# Patient Record
Sex: FEMALE | Race: WHITE | NOT HISPANIC OR LATINO | Employment: OTHER | URBAN - NONMETROPOLITAN AREA
[De-identification: names, ages, dates, MRNs, and addresses within clinical notes are randomized per-mention and may not be internally consistent; named-entity substitution may affect disease eponyms.]

---

## 2024-08-29 ENCOUNTER — PATIENT ROUNDING (BHMG ONLY) (OUTPATIENT)
Dept: FAMILY MEDICINE CLINIC | Facility: CLINIC | Age: 74
End: 2024-08-29
Payer: MEDICARE

## 2024-08-29 ENCOUNTER — OFFICE VISIT (OUTPATIENT)
Dept: FAMILY MEDICINE CLINIC | Facility: CLINIC | Age: 74
End: 2024-08-29
Payer: MEDICARE

## 2024-08-29 VITALS
HEART RATE: 53 BPM | DIASTOLIC BLOOD PRESSURE: 85 MMHG | SYSTOLIC BLOOD PRESSURE: 140 MMHG | OXYGEN SATURATION: 96 % | TEMPERATURE: 98 F | RESPIRATION RATE: 14 BRPM | BODY MASS INDEX: 42.61 KG/M2 | HEIGHT: 64 IN | WEIGHT: 249.6 LBS

## 2024-08-29 DIAGNOSIS — F32.A DEPRESSIVE DISORDER: ICD-10-CM

## 2024-08-29 DIAGNOSIS — I71.9 AORTIC ANEURYSM WITHOUT RUPTURE, UNSPECIFIED PORTION OF AORTA: Primary | ICD-10-CM

## 2024-08-29 DIAGNOSIS — F17.200 TOBACCO USE DISORDER: ICD-10-CM

## 2024-08-29 DIAGNOSIS — Z78.0 ASYMPTOMATIC MENOPAUSAL STATE: ICD-10-CM

## 2024-08-29 DIAGNOSIS — Z13.820 OSTEOPOROSIS SCREENING: ICD-10-CM

## 2024-08-29 DIAGNOSIS — I71.9 AORTIC ANEURYSM WITHOUT RUPTURE, UNSPECIFIED PORTION OF AORTA: ICD-10-CM

## 2024-08-29 DIAGNOSIS — K21.9 GASTROESOPHAGEAL REFLUX DISEASE WITHOUT ESOPHAGITIS: ICD-10-CM

## 2024-08-29 DIAGNOSIS — I10 PRIMARY HYPERTENSION: ICD-10-CM

## 2024-08-29 DIAGNOSIS — E11.9 TYPE 2 DIABETES MELLITUS WITHOUT COMPLICATION, WITHOUT LONG-TERM CURRENT USE OF INSULIN: ICD-10-CM

## 2024-08-29 DIAGNOSIS — Z00.00 ENCOUNTER FOR MEDICAL EXAMINATION TO ESTABLISH CARE: Primary | ICD-10-CM

## 2024-08-29 DIAGNOSIS — E78.5 DYSLIPIDEMIA: ICD-10-CM

## 2024-08-29 PROCEDURE — 83036 HEMOGLOBIN GLYCOSYLATED A1C: CPT

## 2024-08-29 PROCEDURE — 82043 UR ALBUMIN QUANTITATIVE: CPT

## 2024-08-29 PROCEDURE — 86803 HEPATITIS C AB TEST: CPT

## 2024-08-29 PROCEDURE — 85025 COMPLETE CBC W/AUTO DIFF WBC: CPT

## 2024-08-29 PROCEDURE — 80061 LIPID PANEL: CPT

## 2024-08-29 PROCEDURE — 80053 COMPREHEN METABOLIC PANEL: CPT

## 2024-08-29 PROCEDURE — 84443 ASSAY THYROID STIM HORMONE: CPT

## 2024-08-29 RX ORDER — FAMOTIDINE 40 MG/1
40 TABLET, FILM COATED ORAL DAILY
COMMUNITY
Start: 2024-06-19

## 2024-08-29 RX ORDER — LISINOPRIL 40 MG/1
40 TABLET ORAL DAILY
COMMUNITY
Start: 2024-06-19

## 2024-08-29 RX ORDER — VARENICLINE TARTRATE 0.5 (11)-1
KIT ORAL
Qty: 1 EACH | Refills: 0 | Status: SHIPPED | OUTPATIENT
Start: 2024-08-29 | End: 2024-09-26

## 2024-08-29 RX ORDER — LORATADINE 10 MG/1
10 TABLET ORAL DAILY
COMMUNITY

## 2024-08-29 RX ORDER — BUPROPION HYDROCHLORIDE 150 MG/1
150 TABLET ORAL DAILY
COMMUNITY
Start: 2024-06-19

## 2024-08-29 RX ORDER — VARENICLINE TARTRATE 1 MG/1
1 TABLET, FILM COATED ORAL 2 TIMES DAILY
Qty: 56 TABLET | Refills: 1 | Status: SHIPPED | OUTPATIENT
Start: 2024-09-26 | End: 2024-11-21

## 2024-08-29 RX ORDER — ALBUTEROL SULFATE 90 UG/1
2 AEROSOL, METERED RESPIRATORY (INHALATION) EVERY 4 HOURS PRN
COMMUNITY
Start: 2024-05-14

## 2024-08-29 RX ORDER — ALBUTEROL SULFATE 1.25 MG/3ML
1 SOLUTION RESPIRATORY (INHALATION) EVERY 4 HOURS PRN
COMMUNITY

## 2024-08-29 RX ORDER — ROSUVASTATIN CALCIUM 20 MG/1
20 TABLET, COATED ORAL DAILY
COMMUNITY
Start: 2024-06-19

## 2024-08-29 RX ORDER — PANTOPRAZOLE SODIUM 40 MG/1
40 TABLET, DELAYED RELEASE ORAL DAILY
COMMUNITY
Start: 2024-06-19

## 2024-08-29 RX ORDER — METOPROLOL TARTRATE 50 MG
50 TABLET ORAL 2 TIMES DAILY
COMMUNITY
Start: 2024-06-19

## 2024-08-29 NOTE — PROGRESS NOTES
Venipuncture Blood Specimen Collection  Venipuncture performed in LT ARM VIA VENIPUNCTURE by Latoya Solis with good hemostasis. Patient tolerated the procedure well without complications.   08/29/24   Latoya Solis

## 2024-08-29 NOTE — PROGRESS NOTES
August 29, 2024    Hello, may I speak with Rebekah Li?    My name is GWEN      I am  with [facility]  [address] [place] [address].    Before we get started may I verify your date of birth? 1950    I am calling to officially welcome you to our practice and ask about your recent visit. Is this a good time to talk? yes    Tell me about your visit with us. What things went well?  PATIENT STATES VISIT WAS WONDERFUL REALLY DO LOVE HER        We're always looking for ways to make our patients' experiences even better. Do you have recommendations on ways we may improve?  no    Overall were you satisfied with your first visit to our practice? yes       I appreciate you taking the time to speak with me today. Is there anything else I can do for you? no      Thank you, and have a great day.

## 2024-08-29 NOTE — PROGRESS NOTES
Chief Complaint  Establish Care    Subjective    History of Present Illness {CC  Problem List  Visit  Diagnosis   Encounters  Notes  Medications  Labs  Result Review Imaging  Media :23}     Rebekah Li presents to [facility] for Establish Care.      History of Present Illness  73 YOF with history of aortic aneurysm, HTN, HLD, DM2, GERD (perforated ulcer 10 years ago) here to establish care.     Patient last saw CT surgery to follow up on aortic aneurysm in September 2023. Everything was reportedly stable at that time. She has no chest pain or SOB. No abdominal pain. Patient needs new referral to CT surgery bc of recent insurance change.     Patient's blood pressure is controlled for age range. She is 140/85 and on repeat 135/80 in the office today. Patient says she has been on her current meds for HTN for long term and no SE with them.     Patient is taking Metformin for about 4 years now since she was diagnosed with type 2 diabetes. No SE of metformin, no abdominal pain or n/v/d.     Patient is taking Rosuvastatin for HLD with no SE, no leg cramping or pain.     Patient has been on Wellbutrin and Zoloft for about 2 years now, no SE. She was put on the Wellbutrin for both depression and smoking cessation, and she was put on Zoloft for depression. She wants to continue both of these medications at this time.     Patient had a perforated ulcer around 10 years ago with no issues since then and being on Protonix.     Patient is smoking but has quit for 7 days now. She is using nicotine patches. She is really interested in trying Chantix because her daughter has success with it right now.     Patient says she does not need any medication refills at this time.        Review of Systems     Objective     Vital Signs:   /85 (BP Location: Left arm, Patient Position: Sitting, Cuff Size: Large Adult)   Pulse 53   Temp 98 °F (36.7 °C) (Infrared)   Resp 14   Ht 163.2 cm  "(64.25\")   Wt 113 kg (249 lb 9.6 oz)   SpO2 96%   BMI 42.51 kg/m²   Current Outpatient Medications on File Prior to Visit   Medication Sig Dispense Refill    albuterol sulfate  (90 Base) MCG/ACT inhaler Inhale 2 puffs Every 4 (Four) Hours As Needed for Shortness of Air.      buPROPion XL (WELLBUTRIN XL) 150 MG 24 hr tablet Take 1 tablet by mouth Daily.      famotidine (PEPCID) 40 MG tablet Take 1 tablet by mouth Daily.      lisinopril (PRINIVIL,ZESTRIL) 40 MG tablet Take 1 tablet by mouth Daily.      metFORMIN (GLUCOPHAGE) 500 MG tablet Take 1 tablet by mouth 2 (Two) Times a Day With Meals.      metoprolol tartrate (LOPRESSOR) 50 MG tablet Take 1 tablet by mouth 2 (Two) Times a Day.      pantoprazole (PROTONIX) 40 MG EC tablet Take 1 tablet by mouth Daily.      rosuvastatin (CRESTOR) 20 MG tablet Take 1 tablet by mouth Daily.      sertraline (ZOLOFT) 50 MG tablet Take 1 tablet by mouth Daily.      albuterol (ACCUNEB) 1.25 MG/3ML nebulizer solution Take 3 mL by nebulization Every 4 (Four) Hours As Needed for Wheezing.      loratadine (CLARITIN) 10 MG tablet Take 1 tablet by mouth Daily.      [DISCONTINUED] HYDROcodone-acetaminophen (NORCO) 7.5-325 MG per tablet Take 1 tablet by mouth every 6 (six) hours as needed for moderate pain (4-6). Max Daily Amount: 4 tablets 120 tablet 0     No current facility-administered medications on file prior to visit.        Past Medical History:   Diagnosis Date    Anxiety     COPD (chronic obstructive pulmonary disease)     Depression     Hyperlipidemia     Hypertension       Past Surgical History:   Procedure Laterality Date    APPENDECTOMY      HYSTERECTOMY      TUBAL ABDOMINAL LIGATION        Family History   Problem Relation Age of Onset    Hypertension Mother     Hypertension Father     Stroke Father     Heart disease Father     Hypertension Brother     Heart disease Brother     Diabetes Brother     Depression Daughter     Diabetes Daughter     Depression Son     "   Social History     Socioeconomic History    Marital status: Single   Tobacco Use    Smoking status: Former     Current packs/day: 1.00     Average packs/day: 1 pack/day for 0.7 years (0.7 ttl pk-yrs)     Types: Cigarettes     Start date: 2024     Quit date: 1988    Smokeless tobacco: Never   Vaping Use    Vaping status: Never Used   Substance and Sexual Activity    Alcohol use: Yes    Drug use: Never    Sexual activity: Defer         No visits with results within 3 Month(s) from this visit.   Latest known visit with results is:   No results found for any previous visit.         Physical Exam  Vitals and nursing note reviewed.   Constitutional:       Appearance: Normal appearance. She is normal weight.   HENT:      Head: Normocephalic and atraumatic.   Cardiovascular:      Rate and Rhythm: Normal rate and regular rhythm.      Pulses: Normal pulses.      Heart sounds: Normal heart sounds. No murmur heard.     No friction rub. No gallop.   Pulmonary:      Effort: Pulmonary effort is normal. No respiratory distress.      Breath sounds: Normal breath sounds. No stridor. No wheezing, rhonchi or rales.   Chest:      Chest wall: No tenderness.   Abdominal:      General: Abdomen is flat. Bowel sounds are normal. There is no distension.      Palpations: Abdomen is soft. There is no mass.      Tenderness: There is no abdominal tenderness. There is no right CVA tenderness, left CVA tenderness, guarding or rebound.      Hernia: No hernia is present.   Musculoskeletal:         General: Normal range of motion.      Cervical back: Normal range of motion and neck supple.   Skin:     General: Skin is warm and dry.      Capillary Refill: Capillary refill takes less than 2 seconds.      Coloration: Skin is not jaundiced or pale.   Neurological:      General: No focal deficit present.      Mental Status: She is alert and oriented to person, place, and time. Mental status is at baseline.      Motor: No weakness.      Coordination:  Coordination normal.      Gait: Gait normal.   Psychiatric:         Mood and Affect: Mood normal.         Behavior: Behavior normal.         Thought Content: Thought content normal.         Judgment: Judgment normal.          Result Review  Data Reviewed:{ Labs  Result Review  Imaging  Med Tab  Media :23}   I have reviewed this patient's chart.  I have reviewed previous labs, previous imaging, previous medications, and previous encounters with notes that were available in this patient's chart.               Assessment and Plan {CC Problem List  Visit Diagnosis  ROS  Review (Popup)  [facility]  BestPractice  Medications  SmartSets  SnapShot Encounters  Media :23}   Diagnoses and all orders for this visit:    1. Encounter for medical examination to establish care (Primary)  -     Hemoglobin A1c  -     CBC & Differential  -     Comprehensive Metabolic Panel  -     Lipid Panel  -     TSH  -     MicroAlbumin, Urine, Random - Urine, Clean Catch; Future  -     Hepatitis C Antibody  -     MicroAlbumin, Urine, Random - Urine, Clean Catch    2. Type 2 diabetes mellitus without complication, without long-term current use of insulin  -     Hemoglobin A1c  -     Comprehensive Metabolic Panel    3. Aortic aneurysm without rupture, unspecified portion of aorta  Comments:  Followed by CT surgery. No symptoms. Patient needs new referral to see CT surgery due to insurance change.  Orders:  -     Ambulatory Referral to Cardiothoracic Surgery    4. Dyslipidemia  -     Comprehensive Metabolic Panel  -     Lipid Panel    5. Primary hypertension  -     Comprehensive Metabolic Panel    6. Tobacco use disorder  -     Varenicline Tartrate, Starter, 0.5 MG X 11 & 1 MG X 42 tablet therapy pack; Take 0.5 mg by mouth Daily for 3 days, THEN 0.5 mg 2 (Two) Times a Day for 4 days, THEN 1 mg 2 (Two) Times a Day for 21 days. Take 0.5 mg po daily x 3 days, then 0.5 mg po bid x 4 days, then 1 mg po bid  Dispense: 1  each; Refill: 0  -     varenicline (Chantix Continuing Month Clarence) 1 MG tablet; Take 1 tablet by mouth 2 (Two) Times a Day for 56 days.  Dispense: 56 tablet; Refill: 1    7. Gastroesophageal reflux disease without esophagitis  Comments:  Controlled on protonix. Will refill for patient when needed.    8. Depressive disorder    9. Osteoporosis screening  -     DEXA Bone Density Axial; Future    10. Asymptomatic menopausal state  -     DEXA Bone Density Axial; Future        -ER red flags discussed with patient including risk versus benefit and education provided.  -Follow-up with me      Follow Up {Instructions Charge Capture  Follow-up Communications :23}     Patient was given instructions and counseling regarding her condition or for health maintenance advice. Please see specific information pulled into the AVS (placed there by myself) if appropriate.    Return in 1 month (on 9/29/2024) for Medicare Wellness Exam .      Gogo Eaton PA-C

## 2024-08-30 LAB
ALBUMIN SERPL-MCNC: 4.1 G/DL (ref 3.5–5.2)
ALBUMIN UR-MCNC: 1.7 MG/DL
ALBUMIN/GLOB SERPL: 1.6 G/DL
ALP SERPL-CCNC: 75 U/L (ref 39–117)
ALT SERPL W P-5'-P-CCNC: 21 U/L (ref 1–33)
ANION GAP SERPL CALCULATED.3IONS-SCNC: 9 MMOL/L (ref 5–15)
AST SERPL-CCNC: 23 U/L (ref 1–32)
BASOPHILS # BLD AUTO: 0.06 10*3/MM3 (ref 0–0.2)
BASOPHILS NFR BLD AUTO: 0.7 % (ref 0–1.5)
BILIRUB SERPL-MCNC: 0.3 MG/DL (ref 0–1.2)
BUN SERPL-MCNC: 16 MG/DL (ref 8–23)
BUN/CREAT SERPL: 17.2 (ref 7–25)
CALCIUM SPEC-SCNC: 9.2 MG/DL (ref 8.6–10.5)
CHLORIDE SERPL-SCNC: 106 MMOL/L (ref 98–107)
CHOLEST SERPL-MCNC: 136 MG/DL (ref 0–200)
CO2 SERPL-SCNC: 27 MMOL/L (ref 22–29)
CREAT SERPL-MCNC: 0.93 MG/DL (ref 0.57–1)
DEPRECATED RDW RBC AUTO: 45.1 FL (ref 37–54)
EGFRCR SERPLBLD CKD-EPI 2021: 65.8 ML/MIN/1.73
EOSINOPHIL # BLD AUTO: 0.26 10*3/MM3 (ref 0–0.4)
EOSINOPHIL NFR BLD AUTO: 3.1 % (ref 0.3–6.2)
ERYTHROCYTE [DISTWIDTH] IN BLOOD BY AUTOMATED COUNT: 13.5 % (ref 12.3–15.4)
GLOBULIN UR ELPH-MCNC: 2.5 GM/DL
GLUCOSE SERPL-MCNC: 108 MG/DL (ref 65–99)
HBA1C MFR BLD: 6.4 % (ref 4.8–5.6)
HCT VFR BLD AUTO: 40 % (ref 34–46.6)
HCV AB SER QL: NORMAL
HDLC SERPL-MCNC: 54 MG/DL (ref 40–60)
HGB BLD-MCNC: 13 G/DL (ref 12–15.9)
IMM GRANULOCYTES # BLD AUTO: 0.01 10*3/MM3 (ref 0–0.05)
IMM GRANULOCYTES NFR BLD AUTO: 0.1 % (ref 0–0.5)
LDLC SERPL CALC-MCNC: 62 MG/DL (ref 0–100)
LDLC/HDLC SERPL: 1.11 {RATIO}
LYMPHOCYTES # BLD AUTO: 3.98 10*3/MM3 (ref 0.7–3.1)
LYMPHOCYTES NFR BLD AUTO: 48 % (ref 19.6–45.3)
MCH RBC QN AUTO: 29.8 PG (ref 26.6–33)
MCHC RBC AUTO-ENTMCNC: 32.5 G/DL (ref 31.5–35.7)
MCV RBC AUTO: 91.7 FL (ref 79–97)
MONOCYTES # BLD AUTO: 0.67 10*3/MM3 (ref 0.1–0.9)
MONOCYTES NFR BLD AUTO: 8.1 % (ref 5–12)
NEUTROPHILS NFR BLD AUTO: 3.31 10*3/MM3 (ref 1.7–7)
NEUTROPHILS NFR BLD AUTO: 40 % (ref 42.7–76)
NRBC BLD AUTO-RTO: 0 /100 WBC (ref 0–0.2)
PLATELET # BLD AUTO: 295 10*3/MM3 (ref 140–450)
PMV BLD AUTO: 10.7 FL (ref 6–12)
POTASSIUM SERPL-SCNC: 4.7 MMOL/L (ref 3.5–5.2)
PROT SERPL-MCNC: 6.6 G/DL (ref 6–8.5)
RBC # BLD AUTO: 4.36 10*6/MM3 (ref 3.77–5.28)
SODIUM SERPL-SCNC: 142 MMOL/L (ref 136–145)
TRIGL SERPL-MCNC: 110 MG/DL (ref 0–150)
TSH SERPL DL<=0.05 MIU/L-ACNC: 1.74 UIU/ML (ref 0.27–4.2)
VLDLC SERPL-MCNC: 20 MG/DL (ref 5–40)
WBC NRBC COR # BLD AUTO: 8.29 10*3/MM3 (ref 3.4–10.8)

## 2024-09-20 ENCOUNTER — TELEPHONE (OUTPATIENT)
Dept: FAMILY MEDICINE CLINIC | Facility: CLINIC | Age: 74
End: 2024-09-20

## 2024-09-20 NOTE — TELEPHONE ENCOUNTER
Caller: Rebekah Li    Relationship to patient: Self    Best call back number: [phone]    Patient is needing:   WOULD LIKE A CALL TO DISCUSS THE ORDER FOR HER CT SCAN.     SHE WOULD LIKE TO KNOW WHERE THE ORDER WAS SENT AND HOW SHE GOES ABOUT SCHEDULING FOR THAT.     SHE WILL ALSO NEED A CARDIO REFERRAL BUT SHE ISN'T SURE IF SHE HAS TO WAIT UNTIL AFTER HER CT SCAN RESULTS COME BACK OR NOT.

## 2024-10-01 ENCOUNTER — PRIOR AUTHORIZATION (OUTPATIENT)
Dept: FAMILY MEDICINE CLINIC | Facility: CLINIC | Age: 74
End: 2024-10-01
Payer: MEDICARE

## 2024-10-01 NOTE — TELEPHONE ENCOUNTER
Varenicline Tartrate 1mg tabs:  Approved on September 30 by [employer]  PA Case: [id number], Status: Approved, Coverage Starts on: 7/1/2024 12:00:00 AM, Coverage Ends on: 12/29/2024 12:00:00 AM.

## 2024-10-02 ENCOUNTER — OFFICE VISIT (OUTPATIENT)
Dept: FAMILY MEDICINE CLINIC | Facility: CLINIC | Age: 74
End: 2024-10-02
Payer: MEDICARE

## 2024-10-02 VITALS
WEIGHT: 250.4 LBS | OXYGEN SATURATION: 97 % | SYSTOLIC BLOOD PRESSURE: 133 MMHG | RESPIRATION RATE: 14 BRPM | BODY MASS INDEX: 42.75 KG/M2 | HEIGHT: 64 IN | DIASTOLIC BLOOD PRESSURE: 87 MMHG | HEART RATE: 87 BPM

## 2024-10-02 DIAGNOSIS — K21.9 GASTROESOPHAGEAL REFLUX DISEASE WITHOUT ESOPHAGITIS: ICD-10-CM

## 2024-10-02 DIAGNOSIS — E78.5 DYSLIPIDEMIA: ICD-10-CM

## 2024-10-02 DIAGNOSIS — E11.9 TYPE 2 DIABETES MELLITUS WITHOUT COMPLICATION, WITHOUT LONG-TERM CURRENT USE OF INSULIN: Primary | ICD-10-CM

## 2024-10-02 DIAGNOSIS — Z23 FLU VACCINE NEED: ICD-10-CM

## 2024-10-02 DIAGNOSIS — I10 PRIMARY HYPERTENSION: ICD-10-CM

## 2024-10-02 PROCEDURE — G0008 ADMIN INFLUENZA VIRUS VAC: HCPCS

## 2024-10-02 PROCEDURE — 3044F HG A1C LEVEL LT 7.0%: CPT

## 2024-10-02 PROCEDURE — 90662 IIV NO PRSV INCREASED AG IM: CPT

## 2024-10-02 PROCEDURE — 99213 OFFICE O/P EST LOW 20 MIN: CPT

## 2024-10-02 PROCEDURE — 1126F AMNT PAIN NOTED NONE PRSNT: CPT

## 2024-10-02 NOTE — PROGRESS NOTES
Chief Complaint  Diabetes    Subjective    History of Present Illness {  Problem List  Visit  Diagnosis   Encounters  Notes  Medications  Labs  Result Review Imaging  Media :23}     Rebekah Li presents to [facility] for Diabetes.      HPI    73 YOF with history of aortic aneurysm, HTN, HLD, DM2, GERD (perforated ulcer 10 years ago) presents to follow up on lab results and diabetes.     DM2 - Hemoglobin A1c-6.4 1 month ago. That is slightly down from 6.7 around 10 months ago. Taking Metformin 500 mg once daily - she was last Rx twice daily but she only takes it once daily to prevent GI side effects. Microalbumin 1 month ago was normal. She does need refill on Metformin.     HTN- Controlled. 133/87 in office today. On Metoprolol and Lisinopril    HLD- Lipid panel controlled on Rosuvastatin    GERD- She says she has rare symptoms. Taking Pepcid and Protonix.          Current Outpatient Medications:     albuterol (ACCUNEB) 1.25 MG/3ML nebulizer solution, Take 3 mL by nebulization Every 4 (Four) Hours As Needed for Wheezing., Disp: , Rfl:     albuterol sulfate  (90 Base) MCG/ACT inhaler, Inhale 2 puffs Every 4 (Four) Hours As Needed for Shortness of Air., Disp: , Rfl:     buPROPion XL (WELLBUTRIN XL) 150 MG 24 hr tablet, Take 1 tablet by mouth Daily., Disp: , Rfl:     famotidine (PEPCID) 40 MG tablet, Take 1 tablet by mouth Daily., Disp: , Rfl:     lisinopril (PRINIVIL,ZESTRIL) 40 MG tablet, Take 1 tablet by mouth Daily., Disp: , Rfl:     loratadine (CLARITIN) 10 MG tablet, Take 1 tablet by mouth Daily., Disp: , Rfl:     metFORMIN (GLUCOPHAGE) 500 MG tablet, Take 1 tablet by mouth Daily With Breakfast., Disp: 30 tablet, Rfl: 2    metoprolol tartrate (LOPRESSOR) 50 MG tablet, Take 1 tablet by mouth 2 (Two) Times a Day., Disp: , Rfl:     pantoprazole (PROTONIX) 40 MG EC tablet, Take 1 tablet by mouth Daily., Disp: , Rfl:     rosuvastatin (CRESTOR) 20 MG tablet, Take 1  "tablet by mouth Daily., Disp: , Rfl:     sertraline (ZOLOFT) 50 MG tablet, Take 1 tablet by mouth Daily., Disp: , Rfl:     varenicline (Chantix Continuing Month Pak) 1 MG tablet, Take 1 tablet by mouth 2 (Two) Times a Day for 56 days., Disp: 56 tablet, Rfl: 1         Objective     Vital Signs:   /87 (BP Location: Left arm, Patient Position: Sitting, Cuff Size: Large Adult)   Pulse 87   Resp 14   Ht 163.2 cm (64.25\")   Wt 114 kg (250 lb 6.4 oz)   SpO2 97%   BMI 42.65 kg/m²   Current Outpatient Medications on File Prior to Visit   Medication Sig Dispense Refill    albuterol (ACCUNEB) 1.25 MG/3ML nebulizer solution Take 3 mL by nebulization Every 4 (Four) Hours As Needed for Wheezing.      albuterol sulfate  (90 Base) MCG/ACT inhaler Inhale 2 puffs Every 4 (Four) Hours As Needed for Shortness of Air.      buPROPion XL (WELLBUTRIN XL) 150 MG 24 hr tablet Take 1 tablet by mouth Daily.      famotidine (PEPCID) 40 MG tablet Take 1 tablet by mouth Daily.      lisinopril (PRINIVIL,ZESTRIL) 40 MG tablet Take 1 tablet by mouth Daily.      loratadine (CLARITIN) 10 MG tablet Take 1 tablet by mouth Daily.      metoprolol tartrate (LOPRESSOR) 50 MG tablet Take 1 tablet by mouth 2 (Two) Times a Day.      pantoprazole (PROTONIX) 40 MG EC tablet Take 1 tablet by mouth Daily.      rosuvastatin (CRESTOR) 20 MG tablet Take 1 tablet by mouth Daily.      sertraline (ZOLOFT) 50 MG tablet Take 1 tablet by mouth Daily.      varenicline (Chantix Continuing Month Pak) 1 MG tablet Take 1 tablet by mouth 2 (Two) Times a Day for 56 days. 56 tablet 1    [DISCONTINUED] metFORMIN (GLUCOPHAGE) 500 MG tablet Take 1 tablet by mouth 2 (Two) Times a Day With Meals.       No current facility-administered medications on file prior to visit.        Past Medical History:   Diagnosis Date    Anxiety     COPD (chronic obstructive pulmonary disease)     Depression     Hyperlipidemia     Hypertension       Past Surgical History:   Procedure " Laterality Date    APPENDECTOMY      HYSTERECTOMY      TUBAL ABDOMINAL LIGATION        Family History   Problem Relation Age of Onset    Hypertension Mother     Hypertension Father     Stroke Father     Heart disease Father     Hypertension Brother     Heart disease Brother     Diabetes Brother     Depression Daughter     Diabetes Daughter     Depression Son       Social History     Socioeconomic History    Marital status: Single   Tobacco Use    Smoking status: Former     Current packs/day: 1.00     Average packs/day: 1 pack/day for 0.8 years (0.8 ttl pk-yrs)     Types: Cigarettes     Start date: 2024     Quit date: 1988    Smokeless tobacco: Never   Vaping Use    Vaping status: Never Used   Substance and Sexual Activity    Alcohol use: Yes    Drug use: Never    Sexual activity: Defer         Office Visit on 08/29/2024   Component Date Value Ref Range Status    Hemoglobin A1C 08/29/2024 6.40 (H)  4.80 - 5.60 % Final    Glucose 08/29/2024 108 (H)  65 - 99 mg/dL Final    BUN 08/29/2024 16  8 - 23 mg/dL Final    Creatinine 08/29/2024 0.93  0.57 - 1.00 mg/dL Final    Sodium 08/29/2024 142  136 - 145 mmol/L Final    Potassium 08/29/2024 4.7  3.5 - 5.2 mmol/L Final    Chloride 08/29/2024 106  98 - 107 mmol/L Final    CO2 08/29/2024 27.0  22.0 - 29.0 mmol/L Final    Calcium 08/29/2024 9.2  8.6 - 10.5 mg/dL Final    Total Protein 08/29/2024 6.6  6.0 - 8.5 g/dL Final    Albumin 08/29/2024 4.1  3.5 - 5.2 g/dL Final    ALT (SGPT) 08/29/2024 21  1 - 33 U/L Final    AST (SGOT) 08/29/2024 23  1 - 32 U/L Final    Alkaline Phosphatase 08/29/2024 75  39 - 117 U/L Final    Total Bilirubin 08/29/2024 0.3  0.0 - 1.2 mg/dL Final    Globulin 08/29/2024 2.5  gm/dL Final    A/G Ratio 08/29/2024 1.6  g/dL Final    BUN/Creatinine Ratio 08/29/2024 17.2  7.0 - 25.0 Final    Anion Gap 08/29/2024 9.0  5.0 - 15.0 mmol/L Final    eGFR 08/29/2024 65.8  >60.0 mL/min/1.73 Final    Total Cholesterol 08/29/2024 136  0 - 200 mg/dL Final     Triglycerides 08/29/2024 110  0 - 150 mg/dL Final    HDL Cholesterol 08/29/2024 54  40 - 60 mg/dL Final    LDL Cholesterol  08/29/2024 62  0 - 100 mg/dL Final    VLDL Cholesterol 08/29/2024 20  5 - 40 mg/dL Final    LDL/HDL Ratio 08/29/2024 1.11   Final    TSH 08/29/2024 1.740  0.270 - 4.200 uIU/mL Final    Hepatitis C Ab 08/29/2024 Non-Reactive  Non-Reactive Final    Microalbumin, Urine 08/29/2024 1.7  mg/dL Final    WBC 08/29/2024 8.29  3.40 - 10.80 10*3/mm3 Final    RBC 08/29/2024 4.36  3.77 - 5.28 10*6/mm3 Final    Hemoglobin 08/29/2024 13.0  12.0 - 15.9 g/dL Final    Hematocrit 08/29/2024 40.0  34.0 - 46.6 % Final    MCV 08/29/2024 91.7  79.0 - 97.0 fL Final    MCH 08/29/2024 29.8  26.6 - 33.0 pg Final    MCHC 08/29/2024 32.5  31.5 - 35.7 g/dL Final    RDW 08/29/2024 13.5  12.3 - 15.4 % Final    RDW-SD 08/29/2024 45.1  37.0 - 54.0 fl Final    MPV 08/29/2024 10.7  6.0 - 12.0 fL Final    Platelets 08/29/2024 295  140 - 450 10*3/mm3 Final    Neutrophil % 08/29/2024 40.0 (L)  42.7 - 76.0 % Final    Lymphocyte % 08/29/2024 48.0 (H)  19.6 - 45.3 % Final    Monocyte % 08/29/2024 8.1  5.0 - 12.0 % Final    Eosinophil % 08/29/2024 3.1  0.3 - 6.2 % Final    Basophil % 08/29/2024 0.7  0.0 - 1.5 % Final    Immature Grans % 08/29/2024 0.1  0.0 - 0.5 % Final    Neutrophils, Absolute 08/29/2024 3.31  1.70 - 7.00 10*3/mm3 Final    Lymphocytes, Absolute 08/29/2024 3.98 (H)  0.70 - 3.10 10*3/mm3 Final    Monocytes, Absolute 08/29/2024 0.67  0.10 - 0.90 10*3/mm3 Final    Eosinophils, Absolute 08/29/2024 0.26  0.00 - 0.40 10*3/mm3 Final    Basophils, Absolute 08/29/2024 0.06  0.00 - 0.20 10*3/mm3 Final    Immature Grans, Absolute 08/29/2024 0.01  0.00 - 0.05 10*3/mm3 Final    nRBC 08/29/2024 0.0  0.0 - 0.2 /100 WBC Final         Physical Exam  Constitutional:       Appearance: Normal appearance.   HENT:      Head: Normocephalic and atraumatic.   Eyes:      General: No scleral icterus.     Extraocular Movements: Extraocular  movements intact.   Cardiovascular:      Rate and Rhythm: Normal rate and regular rhythm.      Pulses: Normal pulses.      Heart sounds: Normal heart sounds. No murmur heard.     No friction rub. No gallop.   Pulmonary:      Effort: Pulmonary effort is normal.      Breath sounds: Normal breath sounds. No wheezing, rhonchi or rales.   Abdominal:      General: Abdomen is flat. Bowel sounds are normal.      Palpations: Abdomen is soft.      Tenderness: There is no abdominal tenderness. There is no right CVA tenderness or left CVA tenderness.   Musculoskeletal:      Cervical back: Neck supple.   Skin:     General: Skin is warm and dry.   Neurological:      Mental Status: She is alert. Mental status is at baseline.      Coordination: Coordination normal.      Gait: Gait normal.   Psychiatric:         Mood and Affect: Mood normal.         Behavior: Behavior normal.         Thought Content: Thought content normal.         Judgment: Judgment normal.          Result Review  Data Reviewed:{ Labs  Result Review  Imaging  Med Tab  Media :23}   I have reviewed this patient's chart.  I have reviewed previous labs, previous imaging, previous medications, and previous encounters with notes that were available in this patient's chart.               Assessment and Plan {CC Problem List  Visit Diagnosis  ROS  Review (Popup)  [facility]  Quality  BestPractice  Medications  SmartSets  SnapShot Encounters  Media :23}   Diagnoses and all orders for this visit:    1. Type 2 diabetes mellitus without complication, without long-term current use of insulin (Primary)  -     metFORMIN (GLUCOPHAGE) 500 MG tablet; Take 1 tablet by mouth Daily With Breakfast.  Dispense: 30 tablet; Refill: 2    2. Flu vaccine need  -     Fluzone High-Dose 65+yrs    3. Dyslipidemia    4. Primary hypertension    5. Gastroesophageal reflux disease without esophagitis        -Hemoglobin A1c is 6.4 when patient is taking metformin 500 mg once  daily.  She was previous prescribed twice daily dosing but she has been only on once daily dosing for at least the past 3 months due to GI side effect.  Will prescribe at once daily for the patient.  -Blood pressure is controlled.  Continue the same regimen.  -Lipid panel is controlled.  Continue the same regimen.  -ER red flags discussed with patient including risk versus benefit and education provided.  -Follow-up with me      Follow Up {Instructions Charge Capture  Follow-up Communications :23}     Patient was given instructions and counseling regarding her condition or for health maintenance advice. Please see specific information pulled into the AVS (placed there by myself) if appropriate.    Return in about 2 months (around 12/2/2024) for Annual wellness .      Gogo Eaton PA-C

## 2024-11-16 DIAGNOSIS — F17.200 TOBACCO USE DISORDER: ICD-10-CM

## 2024-11-18 RX ORDER — VARENICLINE TARTRATE 1 MG/1
1 TABLET, FILM COATED ORAL 2 TIMES DAILY
Qty: 56 TABLET | Refills: 0 | Status: SHIPPED | OUTPATIENT
Start: 2024-11-18

## 2024-12-04 ENCOUNTER — OFFICE VISIT (OUTPATIENT)
Dept: FAMILY MEDICINE CLINIC | Facility: CLINIC | Age: 74
End: 2024-12-04
Payer: MEDICARE

## 2024-12-04 VITALS
HEART RATE: 66 BPM | HEIGHT: 64 IN | TEMPERATURE: 98 F | SYSTOLIC BLOOD PRESSURE: 149 MMHG | OXYGEN SATURATION: 93 % | WEIGHT: 269.4 LBS | DIASTOLIC BLOOD PRESSURE: 87 MMHG | BODY MASS INDEX: 45.99 KG/M2 | RESPIRATION RATE: 18 BRPM

## 2024-12-04 DIAGNOSIS — K21.9 GASTROESOPHAGEAL REFLUX DISEASE WITHOUT ESOPHAGITIS: ICD-10-CM

## 2024-12-04 DIAGNOSIS — H66.91 RIGHT OTITIS MEDIA, UNSPECIFIED OTITIS MEDIA TYPE: ICD-10-CM

## 2024-12-04 DIAGNOSIS — I10 PRIMARY HYPERTENSION: ICD-10-CM

## 2024-12-04 DIAGNOSIS — Z13.820 SCREENING FOR OSTEOPOROSIS: ICD-10-CM

## 2024-12-04 DIAGNOSIS — E11.9 TYPE 2 DIABETES MELLITUS WITHOUT COMPLICATION, WITHOUT LONG-TERM CURRENT USE OF INSULIN: ICD-10-CM

## 2024-12-04 DIAGNOSIS — Z00.00 MEDICARE ANNUAL WELLNESS VISIT, SUBSEQUENT: Primary | ICD-10-CM

## 2024-12-04 DIAGNOSIS — I71.9 AORTIC ANEURYSM WITHOUT RUPTURE, UNSPECIFIED PORTION OF AORTA: ICD-10-CM

## 2024-12-04 DIAGNOSIS — E78.5 DYSLIPIDEMIA: ICD-10-CM

## 2024-12-04 DIAGNOSIS — M85.80 OSTEOPENIA, UNSPECIFIED LOCATION: ICD-10-CM

## 2024-12-04 DIAGNOSIS — F32.A DEPRESSIVE DISORDER: ICD-10-CM

## 2024-12-04 DIAGNOSIS — Z78.0 ASYMPTOMATIC MENOPAUSAL STATE: ICD-10-CM

## 2024-12-04 PROCEDURE — 3044F HG A1C LEVEL LT 7.0%: CPT

## 2024-12-04 PROCEDURE — G0439 PPPS, SUBSEQ VISIT: HCPCS

## 2024-12-04 PROCEDURE — 1170F FXNL STATUS ASSESSED: CPT

## 2024-12-04 PROCEDURE — 1126F AMNT PAIN NOTED NONE PRSNT: CPT

## 2024-12-04 PROCEDURE — 99214 OFFICE O/P EST MOD 30 MIN: CPT

## 2024-12-04 PROCEDURE — 1160F RVW MEDS BY RX/DR IN RCRD: CPT

## 2024-12-04 PROCEDURE — 1159F MED LIST DOCD IN RCRD: CPT

## 2024-12-04 NOTE — PROGRESS NOTES
Subjective   The ABCs of the Annual Wellness Visit  Medicare Wellness Visit      Rebekah Li is a 74 y.o. patient who presents for a Medicare Wellness Visit.    Patient has aortic aneurysm, HTN, HLD, DM2, GERD (perforated ulcer 10 years ago)     The following portions of the patient's history were reviewed and   updated as appropriate: allergies, current medications, past family history, past medical history, past social history, past surgical history, and problem list.    Compared to one year ago, the patient's physical   health is the same.  Compared to one year ago, the patient's mental   health is the same.    Recent Hospitalizations:  She was not admitted to the hospital during the last year.     Current Medical Providers:  Patient Care Team:  Gogo Eaton PA-C as PCP - General (Family Medicine)    Outpatient Medications Prior to Visit   Medication Sig Dispense Refill    albuterol (ACCUNEB) 1.25 MG/3ML nebulizer solution Take 3 mL by nebulization Every 4 (Four) Hours As Needed for Wheezing.      albuterol sulfate  (90 Base) MCG/ACT inhaler Inhale 2 puffs Every 4 (Four) Hours As Needed for Shortness of Air.      buPROPion XL (WELLBUTRIN XL) 150 MG 24 hr tablet Take 1 tablet by mouth Daily.      famotidine (PEPCID) 40 MG tablet Take 1 tablet by mouth Daily.      lisinopril (PRINIVIL,ZESTRIL) 40 MG tablet Take 1 tablet by mouth Daily.      loratadine (CLARITIN) 10 MG tablet Take 1 tablet by mouth Daily.      metFORMIN (GLUCOPHAGE) 500 MG tablet Take 1 tablet by mouth Daily With Breakfast. 30 tablet 2    metoprolol tartrate (LOPRESSOR) 50 MG tablet Take 1 tablet by mouth 2 (Two) Times a Day. (Patient taking differently: Take 0.5 tablets by mouth 2 (Two) Times a Day.)      pantoprazole (PROTONIX) 40 MG EC tablet Take 1 tablet by mouth Daily.      rosuvastatin (CRESTOR) 20 MG tablet Take 1 tablet by mouth Daily.      sertraline (ZOLOFT) 50 MG tablet Take 1 tablet by mouth Daily.      varenicline  "(CHANTIX) 1 MG tablet Take 1 tablet by mouth twice daily (Patient not taking: Reported on 12/4/2024) 56 tablet 0     No facility-administered medications prior to visit.     No opioid medication identified on active medication list. I have reviewed chart for other potential  high risk medication/s and harmful drug interactions in the elderly.          There is no problem list on file for this patient.    Advance Care Planning Advance Directive is not on file.  ACP discussion was held with the patient during this visit. Patient does not have an advance directive, information provided.            Objective   Vitals:    12/04/24 0830   BP: 149/87   BP Location: Left arm   Patient Position: Sitting   Cuff Size: Large Adult   Pulse: 66   Resp: 18   Temp: 98 °F (36.7 °C)   TempSrc: Temporal   SpO2: 93%   Weight: 122 kg (269 lb 6.4 oz)   Height: 163.2 cm (64.25\")   PainSc: 0-No pain       Estimated body mass index is 45.88 kg/m² as calculated from the following:    Height as of this encounter: 163.2 cm (64.25\").    Weight as of this encounter: 122 kg (269 lb 6.4 oz).    Class 3 Severe Obesity (BMI >=40). Obesity-related health conditions include the following: hypertension, diabetes mellitus, and dyslipidemias. Obesity is worsening. BMI is is above average; BMI management plan is completed. We discussed portion control, increasing exercise, and pharmacologic options including GLP-1a .       Does the patient have evidence of cognitive impairment? No                                                                                                Health  Risk Assessment    Smoking Status:  Social History     Tobacco Use   Smoking Status Every Day    Current packs/day: 0.50    Average packs/day: 1 pack/day for 0.9 years (0.9 ttl pk-yrs)    Types: Cigarettes    Start date: 2024    Last attempt to quit: 1988   Smokeless Tobacco Never     Alcohol Consumption:  Social History     Substance and Sexual Activity   Alcohol Use Yes    " Comment: Rare       Fall Risk Screen  EARNESTADI Fall Risk Assessment was completed, and patient is at LOW risk for falls.Assessment completed on:2024    Depression Screening   Little interest or pleasure in doing things? Over half   Feeling down, depressed, or hopeless? Over half   PHQ-2 Total Score 4   Trouble falling or staying asleep, or sleeping too much? Almost all   Feeling tired or having little energy? Almost all   Poor appetite or overeating? Not at all   Feeling bad about yourself - or that you are a failure or have let yourself or your family down? Not at all   Trouble concentrating on things, such as reading the newspaper or watching television? Not at all   Moving or speaking so slowly that other people could have noticed? Or the opposite - being so fidgety or restless that you have been moving around a lot more than usual? Almost all   Thoughts that you would be better off dead, or of hurting yourself in some way? Not at all   PHQ-9 Total Score 13   If you checked off any problems, how difficult have these problems made it for you to do your work, take care of things at home, or get along with other people? Very difficult      Health Habits and Functional and Cognitive Screenin/4/2024     8:00 AM   Functional & Cognitive Status   Do you have difficulty preparing food and eating? No   Do you have difficulty bathing yourself, getting dressed or grooming yourself? No   Do you have difficulty using the toilet? No   Do you have difficulty moving around from place to place? No   Do you have trouble with steps or getting out of a bed or a chair? Yes   Current Diet Limited Junk Food   Dental Exam Up to date   Eye Exam Not up to date   Exercise (times per week) 0 times per week   Current Exercises Include No Regular Exercise   Do you need help using the phone?  No   Are you deaf or do you have serious difficulty hearing?  No   Do you need help to go to places out of walking distance? No   Do you  need help shopping? No   Do you need help preparing meals?  No   Do you need help with housework?  No   Do you need help with laundry? No   Do you need help taking your medications? No   Do you need help managing money? No   Do you ever drive or ride in a car without wearing a seat belt? No   Have you felt unusual stress, anger or loneliness in the last month? Yes   Who do you live with? Child   If you need help, do you have trouble finding someone available to you? No   Have you been bothered in the last four weeks by sexual problems? No   Do you have difficulty concentrating, remembering or making decisions? Yes           Age-appropriate Screening Schedule:  Refer to the list below for future screening recommendations based on patient's age, sex and/or medical conditions. Orders for these recommended tests are listed in the plan section. The patient has been provided with a written plan.    Health Maintenance List  Health Maintenance   Topic Date Due    BMI FOLLOWUP  Never done    DIABETIC FOOT EXAM  Never done    DIABETIC EYE EXAM  Never done    DXA SCAN  10/08/2023    HEMOGLOBIN A1C  02/28/2025    COVID-19 Vaccine (4 - 2024-25 season) 03/04/2025 (Originally 9/1/2024)    Pneumococcal Vaccine 65+ (1 of 2 - PCV) 03/04/2025 (Originally 10/28/1956)    ZOSTER VACCINE (1 of 2) 03/04/2025 (Originally 10/28/2002)    LIPID PANEL  08/29/2025    ANNUAL WELLNESS VISIT  12/04/2025    MAMMOGRAM  03/08/2026    COLORECTAL CANCER SCREENING  07/13/2026    TDAP/TD VACCINES (2 - Td or Tdap) 10/06/2026    HEPATITIS C SCREENING  Completed    INFLUENZA VACCINE  Completed    PAP SMEAR  Discontinued    URINE MICROALBUMIN  Discontinued                                                                                                                                                CMS Preventative Services Quick Reference  Risk Factors Identified During Encounter  Inactivity/Sedentary: Patient was advised to exercise at least 150 minutes a  "week per CDC recommendations.  Tobacco Use/Dependance Risk (use dotphrase .tobaccocessation for documentation)  Vision Screening Recommended    The above risks/problems have been discussed with the patient.  Pertinent information has been shared with the patient in the After Visit Summary.  An After Visit Summary and PPPS were made available to the patient.    Follow Up:   Next Medicare Wellness visit to be scheduled in 1 year.         Additional E&M Note during same encounter follows:  Patient has additional, significant, and separately identifiable condition(s)/problem(s) that require work above and beyond the Medicare Wellness Visit     Chief Complaint  Medicare Wellness-subsequent and Weight gain (Concerned with weight gain. Patient states she has not changed eating habits and is gaining 2-3 pounds a week)    Subjective   HPI  Rebekah is also being seen today for an annual adult preventative physical exam.         Patient complains of right ear pain and nasal congestion for 2 weeks now. She says nasal discharge is now yellow in color, ear pain is increasing, and she is wondering about possible infection. No fever, chills, headaches, n/v/d, throat pain. Mild cough that is non productive at this time.       Objective   Vital Signs:  /87 (BP Location: Left arm, Patient Position: Sitting, Cuff Size: Large Adult)   Pulse 66   Temp 98 °F (36.7 °C) (Temporal)   Resp 18   Ht 163.2 cm (64.25\")   Wt 122 kg (269 lb 6.4 oz)   SpO2 93%   BMI 45.88 kg/m²   Physical Exam  Constitutional:       General: She is not in acute distress.     Appearance: Normal appearance. She is not ill-appearing.   HENT:      Head: Normocephalic and atraumatic.      Ears:      Comments: Right TM is erythematous and swollen, no perforation, no drainage or swelling in canal. Left TM and canal are WNL     Nose: Congestion present.      Mouth/Throat:      Mouth: Mucous membranes are moist.      Pharynx: Posterior oropharyngeal erythema " present. No oropharyngeal exudate.   Eyes:      General:         Right eye: No discharge.         Left eye: No discharge.      Extraocular Movements: Extraocular movements intact.      Conjunctiva/sclera: Conjunctivae normal.   Cardiovascular:      Rate and Rhythm: Normal rate and regular rhythm.      Pulses: Normal pulses.      Heart sounds: Normal heart sounds. No murmur heard.     No friction rub. No gallop.   Pulmonary:      Effort: Pulmonary effort is normal. No respiratory distress.      Breath sounds: Normal breath sounds. No stridor. No wheezing, rhonchi or rales.   Abdominal:      General: There is no distension.      Palpations: Abdomen is soft. There is no mass.      Tenderness: There is no abdominal tenderness. There is no right CVA tenderness, left CVA tenderness, guarding or rebound.      Hernia: No hernia is present.   Musculoskeletal:         General: Normal range of motion.      Cervical back: Normal range of motion and neck supple.   Skin:     General: Skin is warm and dry.      Findings: No rash.   Neurological:      General: No focal deficit present.      Mental Status: She is alert.      Motor: No weakness.   Psychiatric:         Mood and Affect: Mood normal.         Behavior: Behavior normal.         Thought Content: Thought content normal.         Judgment: Judgment normal.               Assessment and Plan Additional age appropriate preventative wellness advice topics were discussed during today's preventative wellness exam(some topics already addressed during AWV portion of the note above):    Physical Activity: Advised cardiovascular activity 150 minutes per week as tolerated. (example brisk walk for 30 minutes, 5 days a week).     Nutrition: Discussed nutrition plan with patient. Information shared in after visit summary. Goal is for a well balanced diet to enhance overall health.     Healthy Weight: Discussed current and goal BMI with patient. Steps to attain this goal discussed.  Information shared in after visit summary.     Tobacco Misuse Discussion: Information shared in after visit summary.           Medicare annual wellness visit, subsequent         Type 2 diabetes mellitus without complication, without long-term current use of insulin  Continue Metformin 500 mg once daily due to GI SE on higher dose, repeating hemoglobin A1c at next visit.     Orders:    Ambulatory Referral for Diabetic Eye Exam-Ophthalmology    Osteopenia, unspecified location    Orders:    DEXA Bone Density Axial; Future    Screening for osteoporosis    Orders:    DEXA Bone Density Axial; Future    Asymptomatic menopausal state    Orders:    DEXA Bone Density Axial; Future    Right otitis media, unspecified otitis media type    Orders:    amoxicillin-clavulanate (AUGMENTIN) 875-125 MG per tablet; Take 1 tablet by mouth 2 (Two) Times a Day for 10 days.    Dyslipidemia  Continue Crestor. Repeat lipid panel next visit       Primary hypertension  BP elevated today. Patient states she has not had her medication in 2 days now. Encouraged compliance with medication and monitor at home to make sure she is in normal range on average         Aortic aneurysm without rupture, unspecified portion of aorta  Recent follow up with specialist, no changes made, reviewed documentation        Gastroesophageal reflux disease without esophagitis  Continue Protonix. Controlled.        Depressive disorder  Decent control with Zoloft, continue for now, no SI or thoughts of self harm now or in the past.          Body mass index (BMI) of 45.0 to 49.9 in adult  Discussed weight gain and considering GLP1a. She will review risks and benefits of medication. She is working on diet and exercise. Will discuss medication again at next visit              I spent 45 minutes caring for Rebekah on this date of service. This time includes time spent by me in the following activities:preparing for the visit, reviewing tests, obtaining and/or reviewing a  separately obtained history, performing a medically appropriate examination and/or evaluation , counseling and educating the patient/family/caregiver, ordering medications, tests, or procedures, referring and communicating with other health care professionals , and documenting information in the medical record  Follow Up   Return in about 3 months (around 2/28/2025) for follow up on chronic conditions .  Patient was given instructions and counseling regarding her condition or for health maintenance advice. Please see specific information pulled into the AVS if appropriate.

## 2024-12-19 DIAGNOSIS — F17.200 TOBACCO USE DISORDER: ICD-10-CM

## 2024-12-19 RX ORDER — VARENICLINE TARTRATE 1 MG/1
1 TABLET, FILM COATED ORAL 2 TIMES DAILY
Qty: 56 TABLET | Refills: 0 | Status: SHIPPED | OUTPATIENT
Start: 2024-12-19

## 2025-01-10 DIAGNOSIS — E11.9 TYPE 2 DIABETES MELLITUS WITHOUT COMPLICATION, WITHOUT LONG-TERM CURRENT USE OF INSULIN: ICD-10-CM

## 2025-02-03 RX ORDER — FAMOTIDINE 40 MG/1
40 TABLET, FILM COATED ORAL DAILY
OUTPATIENT
Start: 2025-02-03

## 2025-02-03 RX ORDER — LISINOPRIL 40 MG/1
40 TABLET ORAL DAILY
Qty: 90 TABLET | Refills: 0 | Status: SHIPPED | OUTPATIENT
Start: 2025-02-03

## 2025-02-03 RX ORDER — PANTOPRAZOLE SODIUM 40 MG/1
40 TABLET, DELAYED RELEASE ORAL DAILY
OUTPATIENT
Start: 2025-02-03

## 2025-02-17 RX ORDER — LORATADINE 10 MG/1
10 TABLET ORAL DAILY
Qty: 90 TABLET | Refills: 0 | Status: SHIPPED | OUTPATIENT
Start: 2025-02-17

## 2025-02-17 NOTE — TELEPHONE ENCOUNTER
Rx Refill Note  Requested Prescriptions     Pending Prescriptions Disp Refills    loratadine (CLARITIN) 10 MG tablet       Sig: Take 1 tablet by mouth Daily.      Last office visit with prescribing clinician: 12/4/2024   Last telemedicine visit with prescribing clinician: Visit date not found   Next office visit with prescribing clinician: 3/6/2025                         Would you like a call back once the refill request has been completed: [] Yes [] No    If the office needs to give you a call back, can they leave a voicemail: [] Yes [] No    Jen Gonzalez MA  02/17/25, 12:26 EST

## 2025-02-24 RX ORDER — FAMOTIDINE 40 MG/1
40 TABLET, FILM COATED ORAL DAILY
OUTPATIENT
Start: 2025-02-24

## 2025-02-24 RX ORDER — PANTOPRAZOLE SODIUM 40 MG/1
40 TABLET, DELAYED RELEASE ORAL DAILY
OUTPATIENT
Start: 2025-02-24

## 2025-03-06 ENCOUNTER — OFFICE VISIT (OUTPATIENT)
Dept: FAMILY MEDICINE CLINIC | Facility: CLINIC | Age: 75
End: 2025-03-06
Payer: MEDICARE

## 2025-03-06 VITALS
BODY MASS INDEX: 45.07 KG/M2 | SYSTOLIC BLOOD PRESSURE: 122 MMHG | TEMPERATURE: 97.7 F | HEART RATE: 60 BPM | HEIGHT: 64 IN | DIASTOLIC BLOOD PRESSURE: 80 MMHG | WEIGHT: 264 LBS | OXYGEN SATURATION: 96 %

## 2025-03-06 DIAGNOSIS — E78.5 DYSLIPIDEMIA: ICD-10-CM

## 2025-03-06 DIAGNOSIS — I71.9 AORTIC ANEURYSM WITHOUT RUPTURE, UNSPECIFIED PORTION OF AORTA: ICD-10-CM

## 2025-03-06 DIAGNOSIS — Z13.29 SCREENING FOR ENDOCRINE DISORDER: ICD-10-CM

## 2025-03-06 DIAGNOSIS — E78.5 HYPERLIPIDEMIA, UNSPECIFIED HYPERLIPIDEMIA TYPE: ICD-10-CM

## 2025-03-06 DIAGNOSIS — F17.200 TOBACCO USE DISORDER: ICD-10-CM

## 2025-03-06 DIAGNOSIS — K21.9 GASTROESOPHAGEAL REFLUX DISEASE WITHOUT ESOPHAGITIS: ICD-10-CM

## 2025-03-06 DIAGNOSIS — M85.80 OSTEOPENIA, UNSPECIFIED LOCATION: ICD-10-CM

## 2025-03-06 DIAGNOSIS — E11.9 TYPE 2 DIABETES MELLITUS WITHOUT COMPLICATION, WITHOUT LONG-TERM CURRENT USE OF INSULIN: Primary | ICD-10-CM

## 2025-03-06 DIAGNOSIS — I10 PRIMARY HYPERTENSION: ICD-10-CM

## 2025-03-06 NOTE — PROGRESS NOTES
"Chief Complaint  Diabetes    Subjective    History of Present Illness {  Problem List  Visit  Diagnosis   Encounters  Notes  Medications  Labs  Result Review Imaging  Media :23}     Rebekah Li presents to [facility] for Diabetes.      History of Present Illness     74 YOF presents to follow up on chronic conditions. Patient has aortic aneurysm, HTN, HLD, DM2, GERD (perforated ulcer 10 years ago).     Aortic aneurysm - Patient has follow up imaging and visit with Dr. Murray Castillo (or possibly another provider in the office) for this in April. She does this once a year with them.     HTN -     BP Readings from Last 3 Encounters:   03/06/25 122/80   12/04/24 149/87   10/02/24 133/87     Wt Readings from Last 3 Encounters:   03/06/25 120 kg (264 lb)   12/04/24 122 kg (269 lb 6.4 oz)   10/02/24 114 kg (250 lb 6.4 oz)     DM2-  Patient is working on diet and exercise. She has been cutting back on soft drinks and simple sugars.     HLD - Patient is taking Crestor 20 mg daily. Lipid panel controlled 6 months ago     Patient has been working on d/c smoking. She has reduced to 10 cigarettes per day. She says she quit short term several months ago, but she started smoking again because of stress. She is also taking verenicline and feels this helps. She is trying to reduce several cigarettes every couple of days.      GERD - On Protonix         Objective     Vital Signs:   /80 (BP Location: Right arm, Patient Position: Sitting)   Pulse 60   Temp 97.7 °F (36.5 °C) (Oral)   Ht 163.2 cm (64.25\")   Wt 120 kg (264 lb)   SpO2 96%   BMI 44.96 kg/m²   Current Outpatient Medications on File Prior to Visit   Medication Sig Dispense Refill    albuterol (ACCUNEB) 1.25 MG/3ML nebulizer solution Take 3 mL by nebulization Every 4 (Four) Hours As Needed for Wheezing.      albuterol sulfate  (90 Base) MCG/ACT inhaler Inhale 2 puffs Every 4 (Four) Hours As Needed for Shortness " of Air.      buPROPion XL (WELLBUTRIN XL) 150 MG 24 hr tablet Take 1 tablet by mouth Daily.      famotidine (PEPCID) 40 MG tablet Take 1 tablet by mouth Daily.      lisinopril (PRINIVIL,ZESTRIL) 40 MG tablet Take 1 tablet by mouth Daily. 90 tablet 0    loratadine (CLARITIN) 10 MG tablet Take 1 tablet by mouth Daily. 90 tablet 0    metFORMIN (GLUCOPHAGE) 500 MG tablet Take 1 tablet by mouth once daily with breakfast 90 tablet 0    metoprolol tartrate (LOPRESSOR) 50 MG tablet Take 1 tablet by mouth 2 (Two) Times a Day. (Patient taking differently: Take 0.5 tablets by mouth 2 (Two) Times a Day.)      pantoprazole (PROTONIX) 40 MG EC tablet Take 1 tablet by mouth Daily.      rosuvastatin (CRESTOR) 20 MG tablet Take 1 tablet by mouth Daily.      sertraline (ZOLOFT) 50 MG tablet Take 1 tablet by mouth Daily.      varenicline (CHANTIX) 1 MG tablet Take 1 tablet by mouth twice daily 56 tablet 0     No current facility-administered medications on file prior to visit.        Past Medical History:   Diagnosis Date    Anxiety     Cataract     COPD (chronic obstructive pulmonary disease)     Depression     Diabetes mellitus     GERD (gastroesophageal reflux disease)     Hyperlipidemia     Hypertension     Obesity     Peptic ulceration       Past Surgical History:   Procedure Laterality Date    APPENDECTOMY      CARDIAC CATHETERIZATION      COLONOSCOPY      HYSTERECTOMY      TUBAL ABDOMINAL LIGATION        Family History   Problem Relation Age of Onset    Hypertension Mother             Arthritis Mother     Hypertension Father     Stroke Father     Heart disease Father             Hypertension Brother     Heart disease Brother     Diabetes Brother             Anxiety disorder Brother     Depression Daughter     Diabetes Daughter     Anxiety disorder Daughter     Hypertension Daughter     Depression Son             Diabetes Brother             Hypertension Brother       Social History      Socioeconomic History    Marital status: Single   Tobacco Use    Smoking status: Every Day     Current packs/day: 0.50     Average packs/day: 0.5 packs/day for 39.2 years (20.0 ttl pk-yrs)     Types: Cigarettes     Start date: 10/1/1976     Last attempt to quit: 1988    Smokeless tobacco: Never    Tobacco comments:     Keep trying to quit   Vaping Use    Vaping status: Never Used   Substance and Sexual Activity    Alcohol use: Yes     Alcohol/week: 1.0 standard drink of alcohol     Types: 1 Glasses of wine per week     Comment: Rare    Drug use: Never    Sexual activity: Not Currently         No visits with results within 3 Month(s) from this visit.   Latest known visit with results is:   Office Visit on 08/29/2024   Component Date Value Ref Range Status    Hemoglobin A1C 08/29/2024 6.40 (H)  4.80 - 5.60 % Final    Glucose 08/29/2024 108 (H)  65 - 99 mg/dL Final    BUN 08/29/2024 16  8 - 23 mg/dL Final    Creatinine 08/29/2024 0.93  0.57 - 1.00 mg/dL Final    Sodium 08/29/2024 142  136 - 145 mmol/L Final    Potassium 08/29/2024 4.7  3.5 - 5.2 mmol/L Final    Chloride 08/29/2024 106  98 - 107 mmol/L Final    CO2 08/29/2024 27.0  22.0 - 29.0 mmol/L Final    Calcium 08/29/2024 9.2  8.6 - 10.5 mg/dL Final    Total Protein 08/29/2024 6.6  6.0 - 8.5 g/dL Final    Albumin 08/29/2024 4.1  3.5 - 5.2 g/dL Final    ALT (SGPT) 08/29/2024 21  1 - 33 U/L Final    AST (SGOT) 08/29/2024 23  1 - 32 U/L Final    Alkaline Phosphatase 08/29/2024 75  39 - 117 U/L Final    Total Bilirubin 08/29/2024 0.3  0.0 - 1.2 mg/dL Final    Globulin 08/29/2024 2.5  gm/dL Final    A/G Ratio 08/29/2024 1.6  g/dL Final    BUN/Creatinine Ratio 08/29/2024 17.2  7.0 - 25.0 Final    Anion Gap 08/29/2024 9.0  5.0 - 15.0 mmol/L Final    eGFR 08/29/2024 65.8  >60.0 mL/min/1.73 Final    Total Cholesterol 08/29/2024 136  0 - 200 mg/dL Final    Triglycerides 08/29/2024 110  0 - 150 mg/dL Final    HDL Cholesterol 08/29/2024 54  40 - 60 mg/dL Final    LDL  Cholesterol  08/29/2024 62  0 - 100 mg/dL Final    VLDL Cholesterol 08/29/2024 20  5 - 40 mg/dL Final    LDL/HDL Ratio 08/29/2024 1.11   Final    TSH 08/29/2024 1.740  0.270 - 4.200 uIU/mL Final    Hepatitis C Ab 08/29/2024 Non-Reactive  Non-Reactive Final    Microalbumin, Urine 08/29/2024 1.7  mg/dL Final    WBC 08/29/2024 8.29  3.40 - 10.80 10*3/mm3 Final    RBC 08/29/2024 4.36  3.77 - 5.28 10*6/mm3 Final    Hemoglobin 08/29/2024 13.0  12.0 - 15.9 g/dL Final    Hematocrit 08/29/2024 40.0  34.0 - 46.6 % Final    MCV 08/29/2024 91.7  79.0 - 97.0 fL Final    MCH 08/29/2024 29.8  26.6 - 33.0 pg Final    MCHC 08/29/2024 32.5  31.5 - 35.7 g/dL Final    RDW 08/29/2024 13.5  12.3 - 15.4 % Final    RDW-SD 08/29/2024 45.1  37.0 - 54.0 fl Final    MPV 08/29/2024 10.7  6.0 - 12.0 fL Final    Platelets 08/29/2024 295  140 - 450 10*3/mm3 Final    Neutrophil % 08/29/2024 40.0 (L)  42.7 - 76.0 % Final    Lymphocyte % 08/29/2024 48.0 (H)  19.6 - 45.3 % Final    Monocyte % 08/29/2024 8.1  5.0 - 12.0 % Final    Eosinophil % 08/29/2024 3.1  0.3 - 6.2 % Final    Basophil % 08/29/2024 0.7  0.0 - 1.5 % Final    Immature Grans % 08/29/2024 0.1  0.0 - 0.5 % Final    Neutrophils, Absolute 08/29/2024 3.31  1.70 - 7.00 10*3/mm3 Final    Lymphocytes, Absolute 08/29/2024 3.98 (H)  0.70 - 3.10 10*3/mm3 Final    Monocytes, Absolute 08/29/2024 0.67  0.10 - 0.90 10*3/mm3 Final    Eosinophils, Absolute 08/29/2024 0.26  0.00 - 0.40 10*3/mm3 Final    Basophils, Absolute 08/29/2024 0.06  0.00 - 0.20 10*3/mm3 Final    Immature Grans, Absolute 08/29/2024 0.01  0.00 - 0.05 10*3/mm3 Final    nRBC 08/29/2024 0.0  0.0 - 0.2 /100 WBC Final         Physical Exam  Constitutional:       Appearance: Normal appearance. She is obese.   HENT:      Head: Normocephalic and atraumatic.   Eyes:      General: No scleral icterus.     Extraocular Movements: Extraocular movements intact.   Cardiovascular:      Rate and Rhythm: Normal rate and regular rhythm.      Pulses:  Normal pulses.      Heart sounds: Normal heart sounds. No murmur heard.     No friction rub. No gallop.   Pulmonary:      Effort: Pulmonary effort is normal.      Breath sounds: Normal breath sounds. No wheezing, rhonchi or rales.   Abdominal:      General: Abdomen is flat. Bowel sounds are normal.      Palpations: Abdomen is soft.      Tenderness: There is no abdominal tenderness. There is no right CVA tenderness or left CVA tenderness.   Musculoskeletal:      Cervical back: Neck supple.   Skin:     General: Skin is warm and dry.   Neurological:      Mental Status: She is alert. Mental status is at baseline.      Coordination: Coordination normal.      Gait: Gait normal.   Psychiatric:         Mood and Affect: Mood normal.         Behavior: Behavior normal.         Thought Content: Thought content normal.         Judgment: Judgment normal.          Result Review  Data Reviewed:{ Labs  Result Review  Imaging  Med Tab  Media :23}   I have reviewed this patient's chart.  I have reviewed previous labs, previous imaging, previous medications, and previous encounters with notes that were available in this patient's chart.               Assessment and Plan {CC Problem List  Visit Diagnosis  ROS  Review (Popup)  [facility] Maintenance  Quality  BestPractice  Medications  SmartSets  SnapShot Encounters  Media :23}      Type 2 diabetes mellitus without complication, without long-term current use of insulin      Orders:    Microalbumin / Creatinine Urine Ratio - Urine, Clean Catch; Future    Hemoglobin A1c    Comprehensive Metabolic Panel    CBC & Differential    Tobacco use disorder  Continue Verincicline        Dyslipidemia  Continue statin, repeat lipid panel, keep working on d/c smoking, diet, and exercise        Primary hypertension  Controlled. Continue Lisinopril and Metoprolol          Aortic aneurysm without rupture, unspecified portion of aorta  Patient will continue follow up with specialist. She is  scheduled next month       Gastroesophageal reflux disease without esophagitis  Continue Protonix        Osteopenia, unspecified location  Patient is going to complete DEXA. Recommend the following:     -Weight-bearing and muscle-strengthening physical activity  -Smoking cessation  -Avoidance of heavy alcohol use  -Total daily intake (diet plus supplement): Elemental calcium 1200 mg + Vitamin D 800 international units         Screening for endocrine disorder    Orders:    TSH    Hyperlipidemia, unspecified hyperlipidemia type       Orders:    Lipid Panel         -  -ER red flags discussed with patient including risk versus benefit and education provided.  -Follow-up with me      Follow Up {Instructions Charge Capture  Follow-up Communications :23}     Patient was given instructions and counseling regarding her condition or for health maintenance advice. Please see specific information pulled into the AVS (placed there by myself) if appropriate.    Return in about 1 week (around 3/13/2025) for 1 week fasting labs + 3 months follow up chronic conditions .      MARGARET HassanC

## 2025-03-17 ENCOUNTER — CLINICAL SUPPORT (OUTPATIENT)
Dept: FAMILY MEDICINE CLINIC | Facility: CLINIC | Age: 75
End: 2025-03-17
Payer: MEDICARE

## 2025-03-17 DIAGNOSIS — E11.9 TYPE 2 DIABETES MELLITUS WITHOUT COMPLICATION, WITHOUT LONG-TERM CURRENT USE OF INSULIN: ICD-10-CM

## 2025-03-17 PROCEDURE — 85025 COMPLETE CBC W/AUTO DIFF WBC: CPT

## 2025-03-17 PROCEDURE — 80061 LIPID PANEL: CPT

## 2025-03-17 PROCEDURE — 83036 HEMOGLOBIN GLYCOSYLATED A1C: CPT

## 2025-03-17 PROCEDURE — 82043 UR ALBUMIN QUANTITATIVE: CPT

## 2025-03-17 PROCEDURE — 84443 ASSAY THYROID STIM HORMONE: CPT

## 2025-03-17 PROCEDURE — 82570 ASSAY OF URINE CREATININE: CPT

## 2025-03-17 PROCEDURE — 80053 COMPREHEN METABOLIC PANEL: CPT

## 2025-03-17 PROCEDURE — 36415 COLL VENOUS BLD VENIPUNCTURE: CPT

## 2025-03-17 NOTE — PROGRESS NOTES
Venipuncture Blood Specimen Collection  Venipuncture performed in RT ARM by Latoya Solis with good hemostasis. Patient tolerated the procedure well without complications.   03/17/25   Latoya Solis

## 2025-03-18 LAB
ALBUMIN SERPL-MCNC: 4.1 G/DL (ref 3.5–5.2)
ALBUMIN UR-MCNC: 1.8 MG/DL
ALBUMIN/GLOB SERPL: 1.6 G/DL
ALP SERPL-CCNC: 73 U/L (ref 39–117)
ALT SERPL W P-5'-P-CCNC: 22 U/L (ref 1–33)
ANION GAP SERPL CALCULATED.3IONS-SCNC: 12 MMOL/L (ref 5–15)
AST SERPL-CCNC: 16 U/L (ref 1–32)
BASOPHILS # BLD AUTO: 0.04 10*3/MM3 (ref 0–0.2)
BASOPHILS NFR BLD AUTO: 0.4 % (ref 0–1.5)
BILIRUB SERPL-MCNC: 0.3 MG/DL (ref 0–1.2)
BUN SERPL-MCNC: 13 MG/DL (ref 8–23)
BUN/CREAT SERPL: 13.3 (ref 7–25)
CALCIUM SPEC-SCNC: 9.5 MG/DL (ref 8.6–10.5)
CHLORIDE SERPL-SCNC: 103 MMOL/L (ref 98–107)
CHOLEST SERPL-MCNC: 134 MG/DL (ref 0–200)
CO2 SERPL-SCNC: 24 MMOL/L (ref 22–29)
CREAT SERPL-MCNC: 0.98 MG/DL (ref 0.57–1)
CREAT UR-MCNC: 68.7 MG/DL
DEPRECATED RDW RBC AUTO: 41.9 FL (ref 37–54)
EGFRCR SERPLBLD CKD-EPI 2021: 61.5 ML/MIN/1.73
EOSINOPHIL # BLD AUTO: 0.21 10*3/MM3 (ref 0–0.4)
EOSINOPHIL NFR BLD AUTO: 2 % (ref 0.3–6.2)
ERYTHROCYTE [DISTWIDTH] IN BLOOD BY AUTOMATED COUNT: 12.3 % (ref 12.3–15.4)
GLOBULIN UR ELPH-MCNC: 2.6 GM/DL
GLUCOSE SERPL-MCNC: 109 MG/DL (ref 65–99)
HBA1C MFR BLD: 7 % (ref 4.8–5.6)
HCT VFR BLD AUTO: 45.3 % (ref 34–46.6)
HDLC SERPL-MCNC: 46 MG/DL (ref 40–60)
HGB BLD-MCNC: 14.5 G/DL (ref 12–15.9)
IMM GRANULOCYTES # BLD AUTO: 0.02 10*3/MM3 (ref 0–0.05)
IMM GRANULOCYTES NFR BLD AUTO: 0.2 % (ref 0–0.5)
LDLC SERPL CALC-MCNC: 67 MG/DL (ref 0–100)
LDLC/HDLC SERPL: 1.4 {RATIO}
LYMPHOCYTES # BLD AUTO: 4.67 10*3/MM3 (ref 0.7–3.1)
LYMPHOCYTES NFR BLD AUTO: 45.1 % (ref 19.6–45.3)
MCH RBC QN AUTO: 29.5 PG (ref 26.6–33)
MCHC RBC AUTO-ENTMCNC: 32 G/DL (ref 31.5–35.7)
MCV RBC AUTO: 92.1 FL (ref 79–97)
MICROALBUMIN/CREAT UR: 26.2 MG/G (ref 0–29)
MONOCYTES # BLD AUTO: 0.75 10*3/MM3 (ref 0.1–0.9)
MONOCYTES NFR BLD AUTO: 7.2 % (ref 5–12)
NEUTROPHILS NFR BLD AUTO: 4.67 10*3/MM3 (ref 1.7–7)
NEUTROPHILS NFR BLD AUTO: 45.1 % (ref 42.7–76)
NRBC BLD AUTO-RTO: 0 /100 WBC (ref 0–0.2)
PLATELET # BLD AUTO: 309 10*3/MM3 (ref 140–450)
PMV BLD AUTO: 10.8 FL (ref 6–12)
POTASSIUM SERPL-SCNC: 4.2 MMOL/L (ref 3.5–5.2)
PROT SERPL-MCNC: 6.7 G/DL (ref 6–8.5)
RBC # BLD AUTO: 4.92 10*6/MM3 (ref 3.77–5.28)
SODIUM SERPL-SCNC: 139 MMOL/L (ref 136–145)
TRIGL SERPL-MCNC: 119 MG/DL (ref 0–150)
TSH SERPL DL<=0.05 MIU/L-ACNC: 1.73 UIU/ML (ref 0.27–4.2)
VLDLC SERPL-MCNC: 21 MG/DL (ref 5–40)
WBC NRBC COR # BLD AUTO: 10.36 10*3/MM3 (ref 3.4–10.8)

## 2025-03-24 DIAGNOSIS — E11.9 TYPE 2 DIABETES MELLITUS WITHOUT COMPLICATION, WITHOUT LONG-TERM CURRENT USE OF INSULIN: ICD-10-CM

## 2025-04-21 DIAGNOSIS — F17.200 TOBACCO USE DISORDER: ICD-10-CM

## 2025-04-21 RX ORDER — BUPROPION HYDROCHLORIDE 150 MG/1
150 TABLET ORAL DAILY
OUTPATIENT
Start: 2025-04-21

## 2025-04-21 RX ORDER — FAMOTIDINE 40 MG/1
40 TABLET, FILM COATED ORAL DAILY
OUTPATIENT
Start: 2025-04-21

## 2025-04-21 RX ORDER — LISINOPRIL 40 MG/1
40 TABLET ORAL DAILY
Qty: 90 TABLET | Refills: 0 | Status: SHIPPED | OUTPATIENT
Start: 2025-04-21

## 2025-04-21 RX ORDER — VARENICLINE TARTRATE 1 MG/1
1 TABLET, FILM COATED ORAL 2 TIMES DAILY
Qty: 56 TABLET | Refills: 0 | Status: SHIPPED | OUTPATIENT
Start: 2025-04-21

## 2025-05-26 DIAGNOSIS — F17.200 TOBACCO USE DISORDER: ICD-10-CM

## 2025-05-27 RX ORDER — BUPROPION HYDROCHLORIDE 150 MG/1
150 TABLET ORAL DAILY
Qty: 30 TABLET | Refills: 2 | Status: SHIPPED | OUTPATIENT
Start: 2025-05-27

## 2025-05-27 RX ORDER — VARENICLINE TARTRATE 1 MG/1
1 TABLET, FILM COATED ORAL 2 TIMES DAILY
Qty: 56 TABLET | Refills: 0 | Status: SHIPPED | OUTPATIENT
Start: 2025-05-27

## 2025-05-27 NOTE — TELEPHONE ENCOUNTER
Rx Refill Note  Requested Prescriptions     Pending Prescriptions Disp Refills    buPROPion XL (WELLBUTRIN XL) 150 MG 24 hr tablet       Sig: Take 1 tablet by mouth Daily.      Last office visit with prescribing clinician: 3/6/2025   Last telemedicine visit with prescribing clinician: Visit date not found   Next office visit with prescribing clinician: 12/5/2025       Please advise if you want to represcribe this. I cannot find any refills or a replacement prescription.  Lindsay Rojo MA  05/27/25, 08:52 EDT

## 2025-06-09 ENCOUNTER — READMISSION MANAGEMENT (OUTPATIENT)
Dept: CALL CENTER | Facility: HOSPITAL | Age: 75
End: 2025-06-09
Payer: MEDICARE

## 2025-06-09 NOTE — OUTREACH NOTE
Prep Survey      Flowsheet Row Responses   [facility] facility patient discharged from? Non-[facility]   Is LACE score < 7 ? Non-BH Discharge   Eligibility [facility]   Date of Admission 06/04/25   Date of Discharge 06/09/25   Discharge diagnosis Ascending aortic aneurysm, unspecified whether ruptured   Does the patient have one of the following disease processes/diagnoses(primary or secondary)? Other   Prep survey completed? Yes            Sara BURKS - Registered Nurse

## 2025-06-10 ENCOUNTER — TRANSITIONAL CARE MANAGEMENT TELEPHONE ENCOUNTER (OUTPATIENT)
Dept: CALL CENTER | Facility: HOSPITAL | Age: 75
End: 2025-06-10
Payer: MEDICARE

## 2025-06-10 NOTE — OUTREACH NOTE
Call Center TCM Note      Flowsheet Row Responses   [facility] patient discharged from? Non-   Does the patient have one of the following disease processes/diagnoses(primary or secondary)? Other   TCM attempt successful? Yes   Call start time 0856   Call end time 0858   Discharge diagnosis Ascending aortic aneurysm, unspecified whether ruptured   Meds reviewed with patient/caregiver? Yes   Is the patient having any side effects they believe may be caused by any medication additions or changes? No   Does the patient have all medications ordered at discharge? Yes   Is the patient taking all medications as directed (includes completed medication regime)? Yes   Comments 6/16/2025 11:15 AM   Does the patient have an appointment with their PCP within 7-14 days of discharge? Yes   Has home health visited the patient within 72 hours of discharge? N/A   Psychosocial issues? No   Did the patient receive a copy of their discharge instructions? Yes   Nursing interventions Reviewed instructions with patient   What is the patient's perception of their health status since discharge? Improving   Is the patient/caregiver able to teach back the hierarchy of who to call/visit for symptoms/problems? PCP, Specialist, Home health nurse, Urgent Care, ED, 911 Yes   If the patient is a current smoker, are they able to teach back resources for cessation? [id number]   TCM call completed? Yes   Wrap up additional comments Pt doing well and TCM f/u appt scheduled.   Call end time 0858            Nena ABEBE - Registered Nurse    6/10/2025, 09:00 EDT

## 2025-06-11 RX ORDER — LORATADINE 10 MG/1
10 TABLET ORAL DAILY
Qty: 90 TABLET | Refills: 0 | Status: SHIPPED | OUTPATIENT
Start: 2025-06-11

## 2025-06-11 NOTE — TELEPHONE ENCOUNTER
Rx Refill Note  Requested Prescriptions     Pending Prescriptions Disp Refills    loratadine (CLARITIN) 10 MG tablet 90 tablet 0     Sig: Take 1 tablet by mouth Daily.      Last office visit with prescribing clinician: 3/6/2025   Last telemedicine visit with prescribing clinician: Visit date not found   Next office visit with prescribing clinician: 6/16/2025       Protocol failed on the above medication. This patient has an appointment 6/16/2025.    Lindsay Rojo MA  06/11/25, 09:14 EDT

## 2025-06-16 ENCOUNTER — OFFICE VISIT (OUTPATIENT)
Dept: FAMILY MEDICINE CLINIC | Facility: CLINIC | Age: 75
End: 2025-06-16
Payer: MEDICARE

## 2025-06-16 VITALS
TEMPERATURE: 99.9 F | OXYGEN SATURATION: 95 % | HEART RATE: 80 BPM | WEIGHT: 237.6 LBS | HEIGHT: 64 IN | BODY MASS INDEX: 40.56 KG/M2 | SYSTOLIC BLOOD PRESSURE: 103 MMHG | DIASTOLIC BLOOD PRESSURE: 63 MMHG | RESPIRATION RATE: 18 BRPM

## 2025-06-16 DIAGNOSIS — R74.8 ELEVATED ALKALINE PHOSPHATASE LEVEL: ICD-10-CM

## 2025-06-16 DIAGNOSIS — R11.0 NAUSEA: Primary | ICD-10-CM

## 2025-06-16 LAB
BASOPHILS # BLD AUTO: 0.05 10*3/MM3 (ref 0–0.2)
BASOPHILS NFR BLD AUTO: 0.2 % (ref 0–1.5)
DEPRECATED RDW RBC AUTO: 44.4 FL (ref 37–54)
EOSINOPHIL # BLD AUTO: 0.07 10*3/MM3 (ref 0–0.4)
EOSINOPHIL NFR BLD AUTO: 0.3 % (ref 0.3–6.2)
ERYTHROCYTE [DISTWIDTH] IN BLOOD BY AUTOMATED COUNT: 12.9 % (ref 12.3–15.4)
HCT VFR BLD AUTO: 34.5 % (ref 34–46.6)
HGB BLD-MCNC: 10.7 G/DL (ref 12–15.9)
IMM GRANULOCYTES # BLD AUTO: 0.18 10*3/MM3 (ref 0–0.05)
IMM GRANULOCYTES NFR BLD AUTO: 0.8 % (ref 0–0.5)
LYMPHOCYTES # BLD AUTO: 2.77 10*3/MM3 (ref 0.7–3.1)
LYMPHOCYTES NFR BLD AUTO: 12.2 % (ref 19.6–45.3)
MCH RBC QN AUTO: 29.2 PG (ref 26.6–33)
MCHC RBC AUTO-ENTMCNC: 31 G/DL (ref 31.5–35.7)
MCV RBC AUTO: 94.3 FL (ref 79–97)
MONOCYTES # BLD AUTO: 1.88 10*3/MM3 (ref 0.1–0.9)
MONOCYTES NFR BLD AUTO: 8.3 % (ref 5–12)
NEUTROPHILS NFR BLD AUTO: 17.75 10*3/MM3 (ref 1.7–7)
NEUTROPHILS NFR BLD AUTO: 78.2 % (ref 42.7–76)
NRBC BLD AUTO-RTO: 0 /100 WBC (ref 0–0.2)
PLATELET # BLD AUTO: 666 10*3/MM3 (ref 140–450)
PMV BLD AUTO: 9.5 FL (ref 6–12)
RBC # BLD AUTO: 3.66 10*6/MM3 (ref 3.77–5.28)
WBC NRBC COR # BLD AUTO: 22.7 10*3/MM3 (ref 3.4–10.8)

## 2025-06-16 PROCEDURE — 85025 COMPLETE CBC W/AUTO DIFF WBC: CPT

## 2025-06-16 PROCEDURE — 80053 COMPREHEN METABOLIC PANEL: CPT

## 2025-06-16 PROCEDURE — 3051F HG A1C>EQUAL 7.0%<8.0%: CPT

## 2025-06-16 PROCEDURE — 1159F MED LIST DOCD IN RCRD: CPT

## 2025-06-16 PROCEDURE — 1126F AMNT PAIN NOTED NONE PRSNT: CPT

## 2025-06-16 PROCEDURE — 1160F RVW MEDS BY RX/DR IN RCRD: CPT

## 2025-06-16 PROCEDURE — 99214 OFFICE O/P EST MOD 30 MIN: CPT

## 2025-06-16 PROCEDURE — 82977 ASSAY OF GGT: CPT

## 2025-06-16 RX ORDER — ONDANSETRON 4 MG/1
4 TABLET, ORALLY DISINTEGRATING ORAL EVERY 12 HOURS PRN
Qty: 20 TABLET | Refills: 0 | Status: SHIPPED | OUTPATIENT
Start: 2025-06-16

## 2025-06-16 RX ORDER — POTASSIUM CHLORIDE 1500 MG/1
20 TABLET, EXTENDED RELEASE ORAL DAILY
Status: ON HOLD | COMMUNITY
Start: 2025-06-09 | End: 2025-06-20

## 2025-06-16 RX ORDER — AMLODIPINE BESYLATE 2.5 MG/1
2.5 TABLET ORAL DAILY
COMMUNITY
Start: 2025-06-11

## 2025-06-16 RX ORDER — ALBUTEROL SULFATE 90 UG/1
INHALANT RESPIRATORY (INHALATION)
COMMUNITY
End: 2025-06-18

## 2025-06-16 RX ORDER — TRAMADOL HYDROCHLORIDE 50 MG/1
50 TABLET ORAL EVERY 6 HOURS PRN
COMMUNITY
Start: 2025-06-07

## 2025-06-16 RX ORDER — ASPIRIN 81 MG/1
81 TABLET ORAL DAILY
COMMUNITY
Start: 2025-06-07

## 2025-06-16 RX ORDER — CLOPIDOGREL BISULFATE 75 MG/1
75 TABLET ORAL DAILY
COMMUNITY
Start: 2025-06-08 | End: 2025-09-07

## 2025-06-16 RX ORDER — AMMONIUM LACTATE 12 G/100G
12 CREAM TOPICAL
COMMUNITY
Start: 2025-04-14

## 2025-06-16 RX ORDER — FUROSEMIDE 40 MG/1
40 TABLET ORAL DAILY
COMMUNITY
Start: 2025-06-09 | End: 2025-06-18

## 2025-06-16 NOTE — PROGRESS NOTES
Chief Complaint  Hospital Follow Up Visit (Pt was in the hospital 6/4/2025. Chart states aortic aneurism. ) and Nausea (Pt has had nausea since 6/9/25. Has had little to no appetite and can not eat.)    Subjective    History of Present Illness {  Problem List  Visit  Diagnosis   Encounters  Notes  Medications  Labs  Result Review Imaging  Media :23}     Rebekah Li presents to [facility] for Hospital Follow Up Visit (Pt was in the hospital 6/4/2025. Chart states aortic aneurism. ) and Nausea (Pt has had nausea since 6/9/25. Has had little to no appetite and can not eat.).           History of Present Illness  The patient is a 74-year-old individual who presents for a hospital discharge follow-up. They were admitted on 06/04/2025 and underwent an elective ascending aortic aneurysm repair with Dr. Castillo. They have scheduled their follow-up with [facility] and [facility] [place].    They report persistent nausea since their discharge from the hospital on 06/09/2025, which has been interfering with their ability to eat. They describe a sensation of food getting lodged in their throat, leading to regurgitation. Despite feeling hungry, they have been unable to consume much food. Their diet today consisted of half a cup of tea, half a small bottle of Gatorade, water, and a few bites of toast. They experienced an episode of vomiting last night but do not report any abdominal pain. They have not been taking any antiemetic medication. They are currently on tramadol for pain management, which they take as needed.    They have been monitoring their blood pressure and temperature at home, with a reading of 96.9°F this morning. They have a scheduled appointment with their cardiologist on Thursday and with their surgeon on 07/01/2025.    They report normal bowel movements, although they experienced diarrhea for five consecutive days last week. They do not report any urinary  "symptoms and report normal urination.    PAST SURGICAL HISTORY:  Ascending aortic aneurysm repair on 06/04/2025.       Wt Readings from Last 3 Encounters:   06/16/25 108 kg (237 lb 9.6 oz)   03/06/25 120 kg (264 lb)   12/04/24 122 kg (269 lb 6.4 oz)       Objective     Vital Signs:   /63   Pulse 80   Temp 99.9 °F (37.7 °C) (Oral)   Resp 18   Ht 163.2 cm (64.25\")   Wt 108 kg (237 lb 9.6 oz)   SpO2 95%   BMI 40.47 kg/m²   Current Outpatient Medications on File Prior to Visit   Medication Sig Dispense Refill    albuterol (ACCUNEB) 1.25 MG/3ML nebulizer solution Take 3 mL by nebulization Every 4 (Four) Hours As Needed for Wheezing.      albuterol sulfate  (90 Base) MCG/ACT inhaler Inhale 2 puffs Every 4 (Four) Hours As Needed for Shortness of Air.      Albuterol Sulfate, sensor, (ProAir Digihaler) 108 (90 Base) MCG/ACT aerosol powder        amLODIPine (NORVASC) 2.5 MG tablet Take 1 tablet by mouth Daily.      ammonium lactate (AMLACTIN) 12 % cream Apply 12 doses topically to the appropriate area as directed.      aspirin 81 MG EC tablet Take 1 tablet by mouth Daily.      buPROPion XL (WELLBUTRIN XL) 150 MG 24 hr tablet Take 1 tablet by mouth Daily. 30 tablet 2    clopidogrel (PLAVIX) 75 MG tablet Take 1 tablet by mouth Daily.      famotidine (PEPCID) 40 MG tablet Take 1 tablet by mouth Daily.      furosemide (LASIX) 40 MG tablet Take 1 tablet by mouth Daily.      loratadine (CLARITIN) 10 MG tablet Take 1 tablet by mouth Daily. 90 tablet 0    metFORMIN (GLUCOPHAGE) 500 MG tablet Take 1 tablet by mouth 2 (Two) Times a Day With Meals. 180 tablet 0    metoprolol tartrate (LOPRESSOR) 50 MG tablet Take 1 tablet by mouth 2 (Two) Times a Day. (Patient taking differently: Take 0.5 tablets by mouth 2 (Two) Times a Day.)      pantoprazole (PROTONIX) 40 MG EC tablet Take 1 tablet by mouth Daily.      potassium chloride (KLOR-CON M20) 20 MEQ CR tablet Take 1 tablet by mouth Daily.      rosuvastatin (CRESTOR) 20 " MG tablet Take 1 tablet by mouth Daily.      sertraline (ZOLOFT) 50 MG tablet Take 1 tablet by mouth Daily.      traMADol (ULTRAM) 50 MG tablet Take 1 tablet by mouth.      varenicline (CHANTIX) 1 MG tablet Take 1 tablet by mouth 2 (Two) Times a Day. 56 tablet 0    lisinopril (PRINIVIL,ZESTRIL) 40 MG tablet Take 1 tablet by mouth Daily. 90 tablet 0     No current facility-administered medications on file prior to visit.        Past Medical History:   Diagnosis Date    Anxiety     Cataract     COPD (chronic obstructive pulmonary disease)     Depression     Diabetes mellitus     GERD (gastroesophageal reflux disease)     Hyperlipidemia     Hypertension     Obesity     Peptic ulceration       Past Surgical History:   Procedure Laterality Date    APPENDECTOMY      CARDIAC CATHETERIZATION      CARDIAC SURGERY      Aneurysm repair    COLONOSCOPY      HYSTERECTOMY      TUBAL ABDOMINAL LIGATION        Family History   Problem Relation Age of Onset    Hypertension Mother             Arthritis Mother     Hypertension Father     Stroke Father     Heart disease Father             Hypertension Brother     Heart disease Brother     Diabetes Brother             Anxiety disorder Brother     Depression Daughter     Diabetes Daughter     Anxiety disorder Daughter     Hypertension Daughter     Depression Son             Diabetes Brother             Hypertension Brother       Social History     Socioeconomic History    Marital status: Single   Tobacco Use    Smoking status: Former     Average packs/day: 0.5 packs/day for 39.5 years (20.2 ttl pk-yrs)     Types: Cigarettes     Start date: 10/1/1976     Quit date:      Years since quittin.4    Smokeless tobacco: Never    Tobacco comments:     Keep trying to quit   Vaping Use    Vaping status: Never Used   Substance and Sexual Activity    Alcohol use: Yes     Alcohol/week: 1.0 standard drink of alcohol     Types: 1 Glasses of wine per week      Comment: Rare    Drug use: Never    Sexual activity: Not Currently         Clinical Support on 03/17/2025   Component Date Value Ref Range Status    Microalbumin/Creatinine Ratio 03/17/2025 26.2  0.0 - 29.0 mg/g Final    Creatinine, Urine 03/17/2025 68.7  mg/dL Final    Microalbumin, Urine 03/17/2025 1.8  mg/dL Final         Physical Exam  Constitutional:       Appearance: She is ill-appearing.      Comments: Pale   HENT:      Head: Normocephalic and atraumatic.   Eyes:      General: No scleral icterus.     Extraocular Movements: Extraocular movements intact.   Cardiovascular:      Rate and Rhythm: Normal rate and regular rhythm.      Pulses: Normal pulses.      Heart sounds: Normal heart sounds. No murmur heard.     No friction rub. No gallop.   Pulmonary:      Effort: Pulmonary effort is normal.      Breath sounds: Normal breath sounds. No wheezing, rhonchi or rales.   Abdominal:      General: Abdomen is flat. Bowel sounds are normal.      Palpations: Abdomen is soft.      Tenderness: There is no abdominal tenderness. There is no right CVA tenderness or left CVA tenderness.   Musculoskeletal:      Cervical back: Neck supple.   Skin:     General: Skin is warm and dry.   Neurological:      Mental Status: She is alert. Mental status is at baseline.      Coordination: Coordination normal.      Gait: Gait normal.   Psychiatric:         Mood and Affect: Mood normal.         Behavior: Behavior normal.         Thought Content: Thought content normal.         Judgment: Judgment normal.          Result Review  Data Reviewed:{ Labs  Result Review  Imaging  Med Tab  Media :23}   I have reviewed this patient's chart.  I have reviewed previous labs, previous imaging, previous medications, and previous encounters with notes that were available in this patient's chart.               Assessment and Plan {CC Problem List  Visit Diagnosis  ROS  Review (Popup)  [facility] Maintenance  Quality  BestPractice  Medications   SmartSets  SnapShot Encounters  Media :23}      Nausea    Orders:    CBC & Differential    Comprehensive Metabolic Panel    ondansetron ODT (ZOFRAN-ODT) 4 MG disintegrating tablet; Place 1 tablet on the tongue Every 12 (Twelve) Hours As Needed for Nausea or Vomiting.         Assessment & Plan  1. Postoperative status following ascending aortic aneurysm repair:  - Reports constant nausea since hospital discharge, difficulty eating, and one episode of vomiting last night.  - Appears pale with an mildly elevated temperature.  - Laboratory tests today   - Recommend:  -Hold tramadol (or minimize use) d/t concern for slowed gut movement. Recommend switching to acetaminophen, if safe, or lowest effective dose of tramadol.    -Start anti-nausea medication: Ondansetron 4 mg ODT every 12 hours as needed.    -Advance diet slowly: Start with clear liquids (broth, tea, electrolyte drinks, gelatin).  Gradually advance to low-fat, low-fiber, soft foods (applesauce, toast, rice, bananas) as tolerated.    -Hydrate steadily:Small sips of water or electrolyte drinks every 10-15 minutes to prevent dehydration.    -Gentle walking (even around the house) helps stimulate the gut.      2. Elevated temperature:  - Temperature was slightly elevated today, though it was 96.9°F this morning.  - Cause of the fever is unclear.  - Laboratory tests will be conducted to investigate further.    3. Diarrhea:  - Experienced five consecutive days of diarrhea last week.  - Laboratory tests will be conducted     Follow-up:  - A follow-up appointment has been scheduled for 1 week from now.        -ER red flags discussed with patient including risk versus benefit and education provided.  -Follow-up with me      Follow Up {Instructions Charge Capture  Follow-up Communications :23}     Patient was given instructions and counseling regarding her condition or for health maintenance advice. Please see specific information pulled into the AVS (placed there  by myself) if appropriate.    Return in about 1 week (around 6/23/2025) for follow up nausea and weight loss .      Gogo Eaton PA-C      Patient or patient representative verbalized consent for the use of Ambient Listening during the visit with  Gogo Eaton PA-C for chart documentation. 6/16/2025  12:10 EDT

## 2025-06-17 ENCOUNTER — HOSPITAL ENCOUNTER (INPATIENT)
Facility: HOSPITAL | Age: 75
LOS: 2 days | Discharge: HOME | DRG: 872 | End: 2025-06-20
Attending: EMERGENCY MEDICINE
Payer: MEDICARE

## 2025-06-17 DIAGNOSIS — N30.00 ACUTE CYSTITIS WITHOUT HEMATURIA: Primary | ICD-10-CM

## 2025-06-17 DIAGNOSIS — R11.2 NAUSEA AND VOMITING, UNSPECIFIED VOMITING TYPE: ICD-10-CM

## 2025-06-17 LAB
ALBUMIN SERPL-MCNC: 3.5 G/DL (ref 3.5–5.2)
ALBUMIN/GLOB SERPL: 0.9 G/DL
ALP SERPL-CCNC: 181 U/L (ref 39–117)
ALT SERPL W P-5'-P-CCNC: 75 U/L (ref 1–33)
ANION GAP SERPL CALCULATED.3IONS-SCNC: 16 MMOL/L (ref 5–15)
AST SERPL-CCNC: 45 U/L (ref 1–32)
BASOPHILS # BLD AUTO: 0.05 10*3/MM3 (ref 0–0.2)
BASOPHILS NFR BLD AUTO: 0.3 % (ref 0–1.5)
BILIRUB SERPL-MCNC: 0.5 MG/DL (ref 0–1.2)
BILIRUB UR QL STRIP: ABNORMAL
BUN SERPL-MCNC: 11 MG/DL (ref 8–23)
BUN/CREAT SERPL: 12.1 (ref 7–25)
CALCIUM SPEC-SCNC: 9.2 MG/DL (ref 8.6–10.5)
CHLORIDE SERPL-SCNC: 95 MMOL/L (ref 98–107)
CLARITY UR: ABNORMAL
CO2 SERPL-SCNC: 21 MMOL/L (ref 22–29)
COLOR UR: ABNORMAL
CREAT SERPL-MCNC: 0.91 MG/DL (ref 0.57–1)
DEPRECATED RDW RBC AUTO: 45.5 FL (ref 37–54)
EGFRCR SERPLBLD CKD-EPI 2021: 67.2 ML/MIN/1.73
EOSINOPHIL # BLD AUTO: 0.1 10*3/MM3 (ref 0–0.4)
EOSINOPHIL NFR BLD AUTO: 0.6 % (ref 0.3–6.2)
ERYTHROCYTE [DISTWIDTH] IN BLOOD BY AUTOMATED COUNT: 13.9 % (ref 12.3–15.4)
GGT SERPL-CCNC: 123 U/L (ref 5–36)
GLOBULIN UR ELPH-MCNC: 3.7 GM/DL
GLUCOSE SERPL-MCNC: 87 MG/DL (ref 65–99)
GLUCOSE UR STRIP-MCNC: NEGATIVE MG/DL
HCT VFR BLD AUTO: 29.7 % (ref 34–46.6)
HGB BLD-MCNC: 9.8 G/DL (ref 12–15.9)
HGB UR QL STRIP.AUTO: ABNORMAL
IMM GRANULOCYTES # BLD AUTO: 0.09 10*3/MM3 (ref 0–0.05)
IMM GRANULOCYTES NFR BLD AUTO: 0.5 % (ref 0–0.5)
KETONES UR QL STRIP: ABNORMAL
LEUKOCYTE ESTERASE UR QL STRIP.AUTO: ABNORMAL
LYMPHOCYTES # BLD AUTO: 2.26 10*3/MM3 (ref 0.7–3.1)
LYMPHOCYTES NFR BLD AUTO: 13.4 % (ref 19.6–45.3)
MCH RBC QN AUTO: 29.5 PG (ref 26.6–33)
MCHC RBC AUTO-ENTMCNC: 33 G/DL (ref 31.5–35.7)
MCV RBC AUTO: 89.5 FL (ref 79–97)
MONOCYTES # BLD AUTO: 1.74 10*3/MM3 (ref 0.1–0.9)
MONOCYTES NFR BLD AUTO: 10.3 % (ref 5–12)
NEUTROPHILS NFR BLD AUTO: 12.62 10*3/MM3 (ref 1.7–7)
NEUTROPHILS NFR BLD AUTO: 74.9 % (ref 42.7–76)
NITRITE UR QL STRIP: POSITIVE
NRBC BLD AUTO-RTO: 0 /100 WBC (ref 0–0.2)
PH UR STRIP.AUTO: 5.5 [PH] (ref 5–8)
PLATELET # BLD AUTO: 581 10*3/MM3 (ref 140–450)
PMV BLD AUTO: 9.4 FL (ref 6–12)
POTASSIUM SERPL-SCNC: 4.4 MMOL/L (ref 3.5–5.2)
PROT SERPL-MCNC: 7.2 G/DL (ref 6–8.5)
PROT UR QL STRIP: ABNORMAL
RBC # BLD AUTO: 3.32 10*6/MM3 (ref 3.77–5.28)
SODIUM SERPL-SCNC: 132 MMOL/L (ref 136–145)
SP GR UR STRIP: 1.02 (ref 1–1.03)
UROBILINOGEN UR QL STRIP: ABNORMAL
WBC NRBC COR # BLD AUTO: 16.86 10*3/MM3 (ref 3.4–10.8)

## 2025-06-17 PROCEDURE — 85025 COMPLETE CBC W/AUTO DIFF WBC: CPT | Performed by: EMERGENCY MEDICINE

## 2025-06-17 PROCEDURE — 87186 SC STD MICRODIL/AGAR DIL: CPT | Performed by: EMERGENCY MEDICINE

## 2025-06-17 PROCEDURE — 84443 ASSAY THYROID STIM HORMONE: CPT | Performed by: HOSPITALIST

## 2025-06-17 PROCEDURE — 25010000002 ONDANSETRON PER 1 MG: Performed by: EMERGENCY MEDICINE

## 2025-06-17 PROCEDURE — 84145 PROCALCITONIN (PCT): CPT | Performed by: HOSPITALIST

## 2025-06-17 PROCEDURE — 81001 URINALYSIS AUTO W/SCOPE: CPT | Performed by: EMERGENCY MEDICINE

## 2025-06-17 PROCEDURE — 80053 COMPREHEN METABOLIC PANEL: CPT | Performed by: EMERGENCY MEDICINE

## 2025-06-17 PROCEDURE — 87077 CULTURE AEROBIC IDENTIFY: CPT | Performed by: EMERGENCY MEDICINE

## 2025-06-17 PROCEDURE — 82728 ASSAY OF FERRITIN: CPT | Performed by: HOSPITALIST

## 2025-06-17 PROCEDURE — 87086 URINE CULTURE/COLONY COUNT: CPT | Performed by: EMERGENCY MEDICINE

## 2025-06-17 PROCEDURE — 82550 ASSAY OF CK (CPK): CPT | Performed by: HOSPITALIST

## 2025-06-17 PROCEDURE — 36415 COLL VENOUS BLD VENIPUNCTURE: CPT

## 2025-06-17 PROCEDURE — 99285 EMERGENCY DEPT VISIT HI MDM: CPT

## 2025-06-17 PROCEDURE — 80143 DRUG ASSAY ACETAMINOPHEN: CPT | Performed by: HOSPITALIST

## 2025-06-17 PROCEDURE — P9612 CATHETERIZE FOR URINE SPEC: HCPCS

## 2025-06-17 RX ORDER — ONDANSETRON 2 MG/ML
4 INJECTION INTRAMUSCULAR; INTRAVENOUS ONCE
Status: COMPLETED | OUTPATIENT
Start: 2025-06-17 | End: 2025-06-17

## 2025-06-17 RX ORDER — PANTOPRAZOLE SODIUM 40 MG/10ML
40 INJECTION, POWDER, LYOPHILIZED, FOR SOLUTION INTRAVENOUS ONCE
Status: COMPLETED | OUTPATIENT
Start: 2025-06-17 | End: 2025-06-17

## 2025-06-17 RX ORDER — SODIUM CHLORIDE 0.9 % (FLUSH) 0.9 %
10 SYRINGE (ML) INJECTION AS NEEDED
Status: DISCONTINUED | OUTPATIENT
Start: 2025-06-17 | End: 2025-06-20 | Stop reason: HOSPADM

## 2025-06-17 RX ADMIN — ONDANSETRON 4 MG: 2 INJECTION, SOLUTION INTRAMUSCULAR; INTRAVENOUS at 23:48

## 2025-06-17 RX ADMIN — PANTOPRAZOLE SODIUM 40 MG: 40 INJECTION, POWDER, FOR SOLUTION INTRAVENOUS at 23:47

## 2025-06-18 ENCOUNTER — APPOINTMENT (OUTPATIENT)
Dept: CT IMAGING | Facility: HOSPITAL | Age: 75
DRG: 872 | End: 2025-06-18
Payer: MEDICARE

## 2025-06-18 PROBLEM — D64.9 POSTOPERATIVE ANEMIA: Status: ACTIVE | Noted: 2025-06-18

## 2025-06-18 LAB
ALBUMIN SERPL-MCNC: 3.3 G/DL (ref 3.5–5.2)
ALBUMIN/GLOB SERPL: 1 G/DL
ALP SERPL-CCNC: 219 U/L (ref 39–117)
ALT SERPL W P-5'-P-CCNC: 147 U/L (ref 1–33)
ANION GAP SERPL CALCULATED.3IONS-SCNC: 10.3 MMOL/L (ref 5–15)
ANION GAP SERPL CALCULATED.3IONS-SCNC: 12.6 MMOL/L (ref 5–15)
ANION GAP SERPL CALCULATED.3IONS-SCNC: 13.1 MMOL/L (ref 5–15)
APAP SERPL-MCNC: <5 MCG/ML (ref 0–30)
AST SERPL-CCNC: 146 U/L (ref 1–32)
B PARAPERT DNA SPEC QL NAA+PROBE: NOT DETECTED
B PERT DNA SPEC QL NAA+PROBE: NOT DETECTED
BACTERIA BLD CULT: ABNORMAL
BACTERIA ID TEST ISLT QL CULT: ABNORMAL
BACTERIA UR QL AUTO: ABNORMAL /HPF
BASOPHILS # BLD AUTO: 0.05 10*3/MM3 (ref 0–0.2)
BASOPHILS NFR BLD AUTO: 0.3 % (ref 0–1.5)
BILIRUB SERPL-MCNC: 0.5 MG/DL (ref 0–1.2)
BOTTLE TYPE: ABNORMAL
BUN SERPL-MCNC: 14.1 MG/DL (ref 8–23)
BUN SERPL-MCNC: 15.7 MG/DL (ref 8–23)
BUN SERPL-MCNC: 16.2 MG/DL (ref 8–23)
BUN/CREAT SERPL: 14.8 (ref 7–25)
BUN/CREAT SERPL: 15.9 (ref 7–25)
BUN/CREAT SERPL: 16.5 (ref 7–25)
C PNEUM DNA NPH QL NAA+NON-PROBE: NOT DETECTED
CALCIUM SPEC-SCNC: 8.6 MG/DL (ref 8.6–10.5)
CALCIUM SPEC-SCNC: 8.8 MG/DL (ref 8.6–10.5)
CALCIUM SPEC-SCNC: 8.9 MG/DL (ref 8.6–10.5)
CHLORIDE SERPL-SCNC: 102 MMOL/L (ref 98–107)
CHLORIDE SERPL-SCNC: 98 MMOL/L (ref 98–107)
CHLORIDE SERPL-SCNC: 99 MMOL/L (ref 98–107)
CK SERPL-CCNC: 41 U/L (ref 20–180)
CO2 SERPL-SCNC: 19.9 MMOL/L (ref 22–29)
CO2 SERPL-SCNC: 21.4 MMOL/L (ref 22–29)
CO2 SERPL-SCNC: 21.7 MMOL/L (ref 22–29)
CREAT SERPL-MCNC: 0.95 MG/DL (ref 0.57–1)
CREAT SERPL-MCNC: 0.95 MG/DL (ref 0.57–1)
CREAT SERPL-MCNC: 1.02 MG/DL (ref 0.57–1)
D-LACTATE SERPL-SCNC: 1.4 MMOL/L (ref 0.5–2)
DEPRECATED RDW RBC AUTO: 46.7 FL (ref 37–54)
EGFRCR SERPLBLD CKD-EPI 2021: 58.6 ML/MIN/1.73
EGFRCR SERPLBLD CKD-EPI 2021: 63.8 ML/MIN/1.73
EGFRCR SERPLBLD CKD-EPI 2021: 63.8 ML/MIN/1.73
EOSINOPHIL # BLD AUTO: 0.16 10*3/MM3 (ref 0–0.4)
EOSINOPHIL NFR BLD AUTO: 1.1 % (ref 0.3–6.2)
ERYTHROCYTE [DISTWIDTH] IN BLOOD BY AUTOMATED COUNT: 13.8 % (ref 12.3–15.4)
FERRITIN SERPL-MCNC: 468 NG/ML (ref 13–150)
FLUAV SUBTYP SPEC NAA+PROBE: NOT DETECTED
FLUBV RNA ISLT QL NAA+PROBE: NOT DETECTED
FOLATE SERPL-MCNC: 10.2 NG/ML (ref 4.78–24.2)
GLOBULIN UR ELPH-MCNC: 3.2 GM/DL
GLUCOSE BLDC GLUCOMTR-MCNC: 118 MG/DL (ref 70–105)
GLUCOSE BLDC GLUCOMTR-MCNC: 127 MG/DL (ref 70–105)
GLUCOSE BLDC GLUCOMTR-MCNC: 158 MG/DL (ref 70–105)
GLUCOSE BLDC GLUCOMTR-MCNC: 167 MG/DL (ref 70–105)
GLUCOSE SERPL-MCNC: 121 MG/DL (ref 65–99)
GLUCOSE SERPL-MCNC: 121 MG/DL (ref 65–99)
GLUCOSE SERPL-MCNC: 125 MG/DL (ref 65–99)
HADV DNA SPEC NAA+PROBE: NOT DETECTED
HAV IGM SERPL QL IA: NORMAL
HBV CORE IGM SERPL QL IA: NORMAL
HBV SURFACE AG SERPL QL IA: NORMAL
HCOV 229E RNA SPEC QL NAA+PROBE: NOT DETECTED
HCOV HKU1 RNA SPEC QL NAA+PROBE: NOT DETECTED
HCOV NL63 RNA SPEC QL NAA+PROBE: NOT DETECTED
HCOV OC43 RNA SPEC QL NAA+PROBE: NOT DETECTED
HCT VFR BLD AUTO: 29.2 % (ref 34–46.6)
HCV AB SER QL: NORMAL
HGB BLD-MCNC: 9.1 G/DL (ref 12–15.9)
HMPV RNA NPH QL NAA+NON-PROBE: NOT DETECTED
HPIV1 RNA ISLT QL NAA+PROBE: NOT DETECTED
HPIV2 RNA SPEC QL NAA+PROBE: NOT DETECTED
HPIV3 RNA NPH QL NAA+PROBE: NOT DETECTED
HPIV4 P GENE NPH QL NAA+PROBE: NOT DETECTED
HYALINE CASTS UR QL AUTO: ABNORMAL /LPF
IMM GRANULOCYTES # BLD AUTO: 0.09 10*3/MM3 (ref 0–0.05)
IMM GRANULOCYTES NFR BLD AUTO: 0.6 % (ref 0–0.5)
IRON 24H UR-MRATE: 14 MCG/DL (ref 37–145)
IRON SATN MFR SERPL: 6 % (ref 20–50)
LYMPHOCYTES # BLD AUTO: 2.75 10*3/MM3 (ref 0.7–3.1)
LYMPHOCYTES NFR BLD AUTO: 18.2 % (ref 19.6–45.3)
M PNEUMO IGG SER IA-ACNC: NOT DETECTED
MAGNESIUM SERPL-MCNC: 1.9 MG/DL (ref 1.6–2.4)
MAGNESIUM SERPL-MCNC: 2.1 MG/DL (ref 1.6–2.4)
MCH RBC QN AUTO: 28.7 PG (ref 26.6–33)
MCHC RBC AUTO-ENTMCNC: 31.2 G/DL (ref 31.5–35.7)
MCV RBC AUTO: 92.1 FL (ref 79–97)
MONOCYTES # BLD AUTO: 1.49 10*3/MM3 (ref 0.1–0.9)
MONOCYTES NFR BLD AUTO: 9.8 % (ref 5–12)
NEUTROPHILS NFR BLD AUTO: 10.59 10*3/MM3 (ref 1.7–7)
NEUTROPHILS NFR BLD AUTO: 70 % (ref 42.7–76)
NRBC BLD AUTO-RTO: 0 /100 WBC (ref 0–0.2)
PLATELET # BLD AUTO: 517 10*3/MM3 (ref 140–450)
PMV BLD AUTO: 9.2 FL (ref 6–12)
POTASSIUM SERPL-SCNC: 3.7 MMOL/L (ref 3.5–5.2)
POTASSIUM SERPL-SCNC: 4.1 MMOL/L (ref 3.5–5.2)
POTASSIUM SERPL-SCNC: 4.1 MMOL/L (ref 3.5–5.2)
PROCALCITONIN SERPL-MCNC: 0.35 NG/ML (ref 0–0.25)
PROT SERPL-MCNC: 6.5 G/DL (ref 6–8.5)
RBC # BLD AUTO: 3.17 10*6/MM3 (ref 3.77–5.28)
RBC # UR STRIP: ABNORMAL /HPF
REF LAB TEST METHOD: ABNORMAL
RHINOVIRUS RNA SPEC NAA+PROBE: NOT DETECTED
RSV RNA NPH QL NAA+NON-PROBE: NOT DETECTED
SARS-COV-2 RNA RESP QL NAA+PROBE: NOT DETECTED
SODIUM SERPL-SCNC: 131 MMOL/L (ref 136–145)
SODIUM SERPL-SCNC: 133 MMOL/L (ref 136–145)
SODIUM SERPL-SCNC: 134 MMOL/L (ref 136–145)
SQUAMOUS #/AREA URNS HPF: ABNORMAL /HPF
TIBC SERPL-MCNC: 234 MCG/DL (ref 298–536)
TRANSFERRIN SERPL-MCNC: 157 MG/DL (ref 200–360)
TSH SERPL DL<=0.05 MIU/L-ACNC: 1.26 UIU/ML (ref 0.27–4.2)
VIT B12 BLD-MCNC: 596 PG/ML (ref 211–946)
WBC # UR STRIP: ABNORMAL /HPF
WBC NRBC COR # BLD AUTO: 15.13 10*3/MM3 (ref 3.4–10.8)

## 2025-06-18 PROCEDURE — 83735 ASSAY OF MAGNESIUM: CPT | Performed by: HOSPITALIST

## 2025-06-18 PROCEDURE — 82948 REAGENT STRIP/BLOOD GLUCOSE: CPT

## 2025-06-18 PROCEDURE — 63710000001 INSULIN LISPRO (HUMAN) PER 5 UNITS: Performed by: HOSPITALIST

## 2025-06-18 PROCEDURE — 25010000002 MEROPENEM PER 100 MG: Performed by: INTERNAL MEDICINE

## 2025-06-18 PROCEDURE — 80048 BASIC METABOLIC PNL TOTAL CA: CPT | Performed by: HOSPITALIST

## 2025-06-18 PROCEDURE — 87040 BLOOD CULTURE FOR BACTERIA: CPT | Performed by: HOSPITALIST

## 2025-06-18 PROCEDURE — 82607 VITAMIN B-12: CPT | Performed by: HOSPITALIST

## 2025-06-18 PROCEDURE — 74177 CT ABD & PELVIS W/CONTRAST: CPT

## 2025-06-18 PROCEDURE — 25810000003 SODIUM CHLORIDE 0.9 % SOLUTION: Performed by: EMERGENCY MEDICINE

## 2025-06-18 PROCEDURE — 80074 ACUTE HEPATITIS PANEL: CPT | Performed by: HOSPITALIST

## 2025-06-18 PROCEDURE — 87154 CUL TYP ID BLD PTHGN 6+ TRGT: CPT | Performed by: HOSPITALIST

## 2025-06-18 PROCEDURE — 25510000001 IOPAMIDOL PER 1 ML: Performed by: HOSPITALIST

## 2025-06-18 PROCEDURE — 25010000002 CEFTRIAXONE PER 250 MG: Performed by: EMERGENCY MEDICINE

## 2025-06-18 PROCEDURE — 0202U NFCT DS 22 TRGT SARS-COV-2: CPT | Performed by: HOSPITALIST

## 2025-06-18 PROCEDURE — 82948 REAGENT STRIP/BLOOD GLUCOSE: CPT | Performed by: HOSPITALIST

## 2025-06-18 PROCEDURE — 84466 ASSAY OF TRANSFERRIN: CPT | Performed by: HOSPITALIST

## 2025-06-18 PROCEDURE — 85025 COMPLETE CBC W/AUTO DIFF WBC: CPT | Performed by: HOSPITALIST

## 2025-06-18 PROCEDURE — 83605 ASSAY OF LACTIC ACID: CPT | Performed by: HOSPITALIST

## 2025-06-18 PROCEDURE — 83540 ASSAY OF IRON: CPT | Performed by: HOSPITALIST

## 2025-06-18 PROCEDURE — 25810000003 SODIUM CHLORIDE 0.9 % SOLUTION: Performed by: HOSPITALIST

## 2025-06-18 PROCEDURE — 87088 URINE BACTERIA CULTURE: CPT | Performed by: HOSPITALIST

## 2025-06-18 PROCEDURE — 82746 ASSAY OF FOLIC ACID SERUM: CPT | Performed by: HOSPITALIST

## 2025-06-18 PROCEDURE — 87186 SC STD MICRODIL/AGAR DIL: CPT | Performed by: HOSPITALIST

## 2025-06-18 PROCEDURE — 71275 CT ANGIOGRAPHY CHEST: CPT

## 2025-06-18 PROCEDURE — 97161 PT EVAL LOW COMPLEX 20 MIN: CPT | Performed by: PHYSICAL THERAPIST

## 2025-06-18 RX ORDER — ACETAMINOPHEN 325 MG/1
650 TABLET ORAL EVERY 6 HOURS PRN
Status: DISCONTINUED | OUTPATIENT
Start: 2025-06-18 | End: 2025-06-20 | Stop reason: HOSPADM

## 2025-06-18 RX ORDER — ONDANSETRON 2 MG/ML
4 INJECTION INTRAMUSCULAR; INTRAVENOUS EVERY 6 HOURS PRN
Status: DISCONTINUED | OUTPATIENT
Start: 2025-06-18 | End: 2025-06-20 | Stop reason: HOSPADM

## 2025-06-18 RX ORDER — SODIUM CHLORIDE 9 MG/ML
40 INJECTION, SOLUTION INTRAVENOUS AS NEEDED
Status: DISCONTINUED | OUTPATIENT
Start: 2025-06-18 | End: 2025-06-20 | Stop reason: HOSPADM

## 2025-06-18 RX ORDER — NITROGLYCERIN 0.4 MG/1
0.4 TABLET SUBLINGUAL
Status: DISCONTINUED | OUTPATIENT
Start: 2025-06-18 | End: 2025-06-20 | Stop reason: HOSPADM

## 2025-06-18 RX ORDER — ACETAMINOPHEN 650 MG/1
650 SUPPOSITORY RECTAL EVERY 4 HOURS PRN
Status: DISCONTINUED | OUTPATIENT
Start: 2025-06-18 | End: 2025-06-18

## 2025-06-18 RX ORDER — SODIUM CHLORIDE 9 MG/ML
100 INJECTION, SOLUTION INTRAVENOUS CONTINUOUS
Status: DISPENSED | OUTPATIENT
Start: 2025-06-18 | End: 2025-06-18

## 2025-06-18 RX ORDER — INSULIN LISPRO 100 [IU]/ML
2-7 INJECTION, SOLUTION INTRAVENOUS; SUBCUTANEOUS
Status: DISCONTINUED | OUTPATIENT
Start: 2025-06-18 | End: 2025-06-20 | Stop reason: HOSPADM

## 2025-06-18 RX ORDER — ENOXAPARIN SODIUM 100 MG/ML
40 INJECTION SUBCUTANEOUS EVERY 12 HOURS SCHEDULED
Status: DISCONTINUED | OUTPATIENT
Start: 2025-06-19 | End: 2025-06-18

## 2025-06-18 RX ORDER — BISACODYL 5 MG/1
5 TABLET, DELAYED RELEASE ORAL DAILY PRN
Status: DISCONTINUED | OUTPATIENT
Start: 2025-06-18 | End: 2025-06-20 | Stop reason: HOSPADM

## 2025-06-18 RX ORDER — IBUPROFEN 600 MG/1
1 TABLET ORAL
Status: DISCONTINUED | OUTPATIENT
Start: 2025-06-18 | End: 2025-06-20 | Stop reason: HOSPADM

## 2025-06-18 RX ORDER — SODIUM CHLORIDE 0.9 % (FLUSH) 0.9 %
10 SYRINGE (ML) INJECTION AS NEEDED
Status: DISCONTINUED | OUTPATIENT
Start: 2025-06-18 | End: 2025-06-20 | Stop reason: HOSPADM

## 2025-06-18 RX ORDER — NICOTINE POLACRILEX 4 MG
15 LOZENGE BUCCAL
Status: DISCONTINUED | OUTPATIENT
Start: 2025-06-18 | End: 2025-06-20 | Stop reason: HOSPADM

## 2025-06-18 RX ORDER — ACETAMINOPHEN 325 MG/1
650 TABLET ORAL EVERY 4 HOURS PRN
Status: DISCONTINUED | OUTPATIENT
Start: 2025-06-18 | End: 2025-06-18

## 2025-06-18 RX ORDER — IOPAMIDOL 755 MG/ML
100 INJECTION, SOLUTION INTRAVASCULAR
Status: COMPLETED | OUTPATIENT
Start: 2025-06-18 | End: 2025-06-18

## 2025-06-18 RX ORDER — ACETAMINOPHEN 500 MG
1000 TABLET ORAL ONCE
Status: COMPLETED | OUTPATIENT
Start: 2025-06-18 | End: 2025-06-18

## 2025-06-18 RX ORDER — AMOXICILLIN 250 MG
2 CAPSULE ORAL 2 TIMES DAILY PRN
Status: DISCONTINUED | OUTPATIENT
Start: 2025-06-18 | End: 2025-06-20 | Stop reason: HOSPADM

## 2025-06-18 RX ORDER — DEXTROSE MONOHYDRATE 25 G/50ML
25 INJECTION, SOLUTION INTRAVENOUS
Status: DISCONTINUED | OUTPATIENT
Start: 2025-06-18 | End: 2025-06-20 | Stop reason: HOSPADM

## 2025-06-18 RX ORDER — ACETAMINOPHEN 160 MG/5ML
650 SOLUTION ORAL EVERY 4 HOURS PRN
Status: DISCONTINUED | OUTPATIENT
Start: 2025-06-18 | End: 2025-06-18

## 2025-06-18 RX ORDER — PANTOPRAZOLE SODIUM 40 MG/1
40 TABLET, DELAYED RELEASE ORAL
Status: DISCONTINUED | OUTPATIENT
Start: 2025-06-18 | End: 2025-06-19

## 2025-06-18 RX ORDER — SODIUM CHLORIDE 0.9 % (FLUSH) 0.9 %
10 SYRINGE (ML) INJECTION EVERY 12 HOURS SCHEDULED
Status: DISCONTINUED | OUTPATIENT
Start: 2025-06-18 | End: 2025-06-20 | Stop reason: HOSPADM

## 2025-06-18 RX ORDER — POLYETHYLENE GLYCOL 3350 17 G/17G
17 POWDER, FOR SOLUTION ORAL DAILY PRN
Status: DISCONTINUED | OUTPATIENT
Start: 2025-06-18 | End: 2025-06-20 | Stop reason: HOSPADM

## 2025-06-18 RX ORDER — BISACODYL 10 MG
10 SUPPOSITORY, RECTAL RECTAL DAILY PRN
Status: DISCONTINUED | OUTPATIENT
Start: 2025-06-18 | End: 2025-06-20 | Stop reason: HOSPADM

## 2025-06-18 RX ORDER — METOPROLOL TARTRATE 25 MG/1
25 TABLET, FILM COATED ORAL 2 TIMES DAILY
COMMUNITY

## 2025-06-18 RX ORDER — CALCIUM CARBONATE 500 MG/1
1 TABLET, CHEWABLE ORAL 2 TIMES DAILY PRN
Status: DISCONTINUED | OUTPATIENT
Start: 2025-06-18 | End: 2025-06-20 | Stop reason: HOSPADM

## 2025-06-18 RX ADMIN — INSULIN LISPRO 2 UNITS: 100 INJECTION, SOLUTION INTRAVENOUS; SUBCUTANEOUS at 17:31

## 2025-06-18 RX ADMIN — Medication 10 ML: at 21:11

## 2025-06-18 RX ADMIN — ACETAMINOPHEN 650 MG: 325 TABLET, FILM COATED ORAL at 10:36

## 2025-06-18 RX ADMIN — Medication 10 ML: at 02:27

## 2025-06-18 RX ADMIN — MEROPENEM 1000 MG: 1 INJECTION INTRAVENOUS at 21:10

## 2025-06-18 RX ADMIN — PANTOPRAZOLE SODIUM 40 MG: 40 TABLET, DELAYED RELEASE ORAL at 07:59

## 2025-06-18 RX ADMIN — INSULIN LISPRO 2 UNITS: 100 INJECTION, SOLUTION INTRAVENOUS; SUBCUTANEOUS at 12:23

## 2025-06-18 RX ADMIN — CEFTRIAXONE 2000 MG: 2 INJECTION, POWDER, FOR SOLUTION INTRAMUSCULAR; INTRAVENOUS at 02:27

## 2025-06-18 RX ADMIN — SODIUM CHLORIDE 100 ML/HR: 9 INJECTION, SOLUTION INTRAVENOUS at 03:59

## 2025-06-18 RX ADMIN — ACETAMINOPHEN 650 MG: 325 TABLET, FILM COATED ORAL at 23:35

## 2025-06-18 RX ADMIN — ACETAMINOPHEN 1000 MG: 500 TABLET, FILM COATED ORAL at 00:39

## 2025-06-18 RX ADMIN — IOPAMIDOL 100 ML: 755 INJECTION, SOLUTION INTRAVENOUS at 02:23

## 2025-06-18 RX ADMIN — Medication 10 ML: at 08:03

## 2025-06-18 RX ADMIN — SODIUM CHLORIDE 500 ML: 9 INJECTION, SOLUTION INTRAVENOUS at 02:26

## 2025-06-18 RX ADMIN — PANTOPRAZOLE SODIUM 40 MG: 40 TABLET, DELAYED RELEASE ORAL at 17:32

## 2025-06-18 NOTE — CASE MANAGEMENT/SOCIAL WORK
Discharge Planning Assessment   [place]     Patient Name: Rebekah Li  MRN: [id number]  Today's Date: 6/18/2025    Admit Date: 6/17/2025    Plan: Home with daughter, Nebulizer (supplies from 's office) Follow PT/OT recommendations. Pt refusing services @ this time.   Discharge Needs Assessment       Row Name 06/18/25 0954       Living Environment    People in Home child(kathya), adult    Name(s) of People in Home Inés-Daughter (Works during the daytime.)    Current Living Arrangements other (see comments)  Mobile Home. 5 steps into the home.    Potentially Unsafe Housing Conditions none    In the past 12 months has the electric, gas, oil, or water company threatened to shut off services in your home? No    Primary Care Provided by self    Provides Primary Care For no one    Family Caregiver if Needed child(kathya), adult    Family Caregiver Names Inés iL    Quality of Family Relationships helpful;involved;supportive    Able to Return to Prior Arrangements yes       Resource/Environmental Concerns    Resource/Environmental Concerns none    Transportation Concerns none       Transportation Needs    In the past 12 months, has lack of transportation kept you from medical appointments or from getting medications? no    In the past 12 months, has lack of transportation kept you from meetings, work, or from getting things needed for daily living? No       Food Insecurity    Within the past 12 months, you worried that your food would run out before you got the money to buy more. Never true    Within the past 12 months, the food you bought just didn't last and you didn't have money to get more. Never true       Transition Planning    Patient/Family Anticipates Transition to home with family    Patient/Family Anticipated Services at Transition     Transportation Anticipated family or friend will provide       Discharge Needs Assessment    Equipment Currently Used at Home rollator;grab bar;glucometer;bp  cuff;nebulizer    Concerns to be Addressed discharge planning    Anticipated Changes Related to Illness none    Provided Post Acute Provider List? Refused    Refused Provider List Comment Pt voiced she does not want HH and doesn't want to go anywhere for rehab.    Provided Post Acute Provider Quality & Resource List? Refused    Refused Quality and Resource List Comment Pt voiced she does not want HH and doesn't want to go anywhere for rehab.    Offered/Gave Vendor List no    Current Discharge Risk other (see comments)  Alone in home when daughter is at work.              Discharge Plan       Row Name 06/18/25 1000       Plan    Plan Home with daughter, Nebulizer (supplies from Dr's office) Follow PT/OT recommendations. Pt refusing services @ this time.    Patient/Family in Agreement with Plan yes    Provided Post Acute Provider List? Refused    Refused Provider List Comment Pt voiced she does not want HH and doesn't want to go anywhere for rehab.    Provided Post Acute Provider Quality & Resource List? Refused    Refused Quality and Resource List Comment Pt voiced she does not want HH and doesn't want to go anywhere for rehab.    Plan Comments CM met with patient at the bedside to review discharge planning. Patient lives at daughter's home.  Pt is able to take own medications, daughter completes all other IADLs. Daughter Inés to transport patient at d/c. Confirmed PCP-Gogo Eaton, insurance, and pharmacy. Patient declines M2B. Patient denies any difficulty affording food, utilities, or medications. Patient is not current with any HHC/PT services. DC Barriers:Abnormal labs, monitor labs,NS IV, Rocephin, Telemetry, PT/OT consults--monitor for recommendations.              Demographic Summary       Row Name 06/18/25 0971       General Information    Admission Type inpatient    Arrived From home    Required Notices Provided Important Message from Medicare    Referral Source admission list    Reason for Consult  discharge planning    Preferred Language English       Contact Information    Permission Granted to Share Info With               Functional Status       Row Name 06/18/25 0953       Functional Status    Usual Activity Tolerance fair    Current Activity Tolerance poor       Functional Status, IADL    Medications independent    Meal Preparation completely dependent    Housekeeping completely dependent    Laundry completely dependent    Shopping completely dependent    If for any reason you need help with day-to-day activities such as bathing, preparing meals, shopping, managing finances, etc., do you get the help you need? I get all the help I need                Patient Forms       Row Name 06/18/25 0952       Patient Forms    Important Message from Medicare (IMM) Delivered  6/18 IMM per registration.    Delivered to Patient    Method of delivery In person     [place] [address]  Phone: [phone]  Fax: [phone]

## 2025-06-18 NOTE — PROGRESS NOTES
[facility] MEDICINE SERVICE  DAILY PROGRESS NOTE    NAME: Rebekah Li  : 1950  MRN: [id number]      LOS: 0 days     PROVIDER OF SERVICE: Jose Smith MD    Chief Complaint: Postoperative anemia    Subjective:     Interval History:  History taken from: patient    Patient was seen and evaluated at bedside. No complaints. Treatment plan was communicated to the patient. All questioned answered.          Review of Systems:   Review of Systems   Constitutional:  Negative for chills and fever.   Respiratory:  Negative for shortness of breath.    Cardiovascular:  Negative for chest pain.   Gastrointestinal:  Negative for abdominal pain.       Objective:     Vital Signs  Temp:  [98.2 °F (36.8 °C)-102 °F (38.9 °C)] 98.3 °F (36.8 °C)  Heart Rate:  [] 85  Resp:  [15-31] 28  BP: (103-150)/(63-89) 116/68  Flow (L/min) (Oxygen Therapy):  [2] 2   Body mass index is 40.53 kg/m².    Physical Exam  Physical Exam  Constitutional:       General: She is not in acute distress.  Cardiovascular:      Rate and Rhythm: Normal rate.      Heart sounds: No murmur heard.  Pulmonary:      Effort: Pulmonary effort is normal. No respiratory distress.   Abdominal:      General: Bowel sounds are normal.      Palpations: Abdomen is soft.   Neurological:      Mental Status: She is alert.            Diagnostic Data    Results from last 7 days   Lab Units 25  0558 25  0133 25  2331   WBC 10*3/mm3 15.13*  --  16.86*   HEMOGLOBIN g/dL 9.1*  --  9.8*   HEMATOCRIT % 29.2*  --  29.7*   PLATELETS 10*3/mm3 517*  --  581*   GLUCOSE mg/dL 121*   < > 121*   CREATININE mg/dL 0.95   < > 0.95   BUN mg/dL 14.1   < > 15.7   SODIUM mmol/L 134*   < > 131*   POTASSIUM mmol/L 3.7   < > 4.1   AST (SGOT) U/L  --   --  146*   ALT (SGPT) U/L  --   --  147*   ALK PHOS U/L  --   --  219*   BILIRUBIN mg/dL  --   --  0.5   ANION GAP mmol/L 10.3   < > 13.1    < > = values in this interval not displayed.       CT Angiogram  Chest  Result Date: 6/18/2025  Impression: 1.Postsurgical changes related to repair of ascending aorta. A small amount of fluid is present along the proximal aorta with mild stranding seen within the mediastinal fat likely related to recent surgery. No evidence of focal collection. No evidence of  contrast extravasation. No evidence of dissection. No evidence of pulmonary embolism. 2.Circumferential bladder wall thickening with stranding seen within the adjacent fat likely related to cystitis. Stranding is seen surrounding the right kidney which may be related to pyelonephritis. No evidence of hydronephrosis. No definite obstructing calculus identified. 3.Hepatic steatosis. 4.Ancillary findings as described above. Electronically Signed: Melissa Torrez MD  6/18/2025 2:45 AM EDT  Workstation ID: [id number]    CT Abdomen Pelvis With Contrast  Result Date: 6/18/2025  Impression: 1.Postsurgical changes related to repair of ascending aorta. A small amount of fluid is present along the proximal aorta with mild stranding seen within the mediastinal fat likely related to recent surgery. No evidence of focal collection. No evidence of  contrast extravasation. No evidence of dissection. No evidence of pulmonary embolism. 2.Circumferential bladder wall thickening with stranding seen within the adjacent fat likely related to cystitis. Stranding is seen surrounding the right kidney which may be related to pyelonephritis. No evidence of hydronephrosis. No definite obstructing calculus identified. 3.Hepatic steatosis. 4.Ancillary findings as described above. Electronically Signed: Melissa Torrez MD  6/18/2025 2:45 AM EDT  Workstation ID: [id number]        I reviewed the patient's new clinical results.    Assessment/Plan:     Active and Resolved Problems  Active Hospital Problems    Diagnosis  POA    **Postoperative anemia [D64.9]  Yes      Resolved Hospital Problems   No resolved problems to display.       Diagnoses  Acute UTI/Cystitis  with suspicion for pyelonephritis  Postoperative anemia due to acute blood loss  Status post thoracic aortic aneurysm repair   Hyponatremia due to hypovolemia  Elevated liver enzymes   Hypertension  Type 2 diabetes mellitus  Leukocytosis     PLAN  -Currently being treated with CTX for pyelonephritis/Cystitis. Pt with fever, tachycardia and leukocytosis. UA with Pyuria and bacteria. Blood and urine cultures pending. CT chest without signs of infection.  -Pt with Postop anemia s/p thoracic aortic aneurysm repair on 6/4 by Dr. Castillo at [facility], no s/s of acute GI bleed, FOBT negative, CT chest showing small amount of fluid is present along the proximal aorta with mild stranding seen within the mediastinal fat likely related to recent surgery. No evidence of focal collection. No evidence of contrast extravasation. No evidence of dissection. Continue to monitor H/H, transfuse Hgb > 7.      VTE Prophylaxis:  No VTE prophylaxis order currently exists.             Disposition Planning:     Barriers to Discharge:Not medically cleared  Anticipated Date of Discharge: 24-48 hours  Place of Discharge: Home with daughter, Nebulizer (supplies from Dr's office) Follow PT/OT recommendations. Pt refusing services @ this time.       Time: +35 minutes     Code Status and Medical Interventions: CPR (Attempt to Resuscitate); Full Support   Ordered at: 06/18/25 0124     Code Status (Patient has no pulse and is not breathing):    CPR (Attempt to Resuscitate)     Medical Interventions (Patient has pulse or is breathing):    Full Support       Signature: Electronically signed by Jose Smith MD, 06/18/25, 17:03 EDT.  [facility] Hospitalist Team

## 2025-06-18 NOTE — ED PROVIDER NOTES
"Subjective   History of Present Illness  Chief complaint: Patient is a 74-year-old who recently had aortic aneurysm repair at [facility] by .  She was discharged last Monday.  She had her family doctor follow-up on Monday.  She had blood drawn yesterday.  She was called and told today that her \"white blood cell was high, sodium low and she needed to come into the emergency department to be checked.  She was concerned for possibly \"internal bleeding\".  The patient has had no blood loss in her stool.  She has had some nausea.  She has been able to keep down liquids and broth.  Food has made her nauseated however and she has vomited some.  She has had some dry heaving as well.  She has not had fever.    Context:    Duration:    Timing:    Severity:    Associated Symptoms:        PCP:  LMP:      Review of Systems   Constitutional:  Negative for fever.   Gastrointestinal:  Positive for nausea and vomiting.       Past Medical History:   Diagnosis Date    Anxiety     Cataract     COPD (chronic obstructive pulmonary disease)     Depression     Diabetes mellitus     GERD (gastroesophageal reflux disease)     Hyperlipidemia     Hypertension     Obesity     Peptic ulceration        No Known Allergies    Past Surgical History:   Procedure Laterality Date    APPENDECTOMY      CARDIAC CATHETERIZATION      CARDIAC SURGERY      Aneurysm repair    COLONOSCOPY      HYSTERECTOMY      TUBAL ABDOMINAL LIGATION         Family History   Problem Relation Age of Onset    Hypertension Mother             Arthritis Mother     Hypertension Father     Stroke Father     Heart disease Father             Hypertension Brother     Heart disease Brother     Diabetes Brother             Anxiety disorder Brother     Depression Daughter     Diabetes Daughter     Anxiety disorder Daughter     Hypertension Daughter     Depression Son             Diabetes Brother             Hypertension Brother        Social " History     Socioeconomic History    Marital status:    Tobacco Use    Smoking status: Former     Average packs/day: 0.5 packs/day for 39.5 years (20.2 ttl pk-yrs)     Types: Cigarettes     Start date: 10/1/1976     Quit date:      Years since quittin.4    Smokeless tobacco: Never    Tobacco comments:     Keep trying to quit   Vaping Use    Vaping status: Never Used   Substance and Sexual Activity    Alcohol use: Yes     Alcohol/week: 1.0 standard drink of alcohol     Types: 1 Glasses of wine per week     Comment: Rare    Drug use: Never    Sexual activity: Not Currently           Objective   Physical Exam  Vitals and nursing note reviewed. Exam conducted with a chaperone present.   Constitutional:       General: She is not in acute distress.     Appearance: She is obese.   HENT:      Head: Normocephalic and atraumatic.   Eyes:      Pupils: Pupils are equal, round, and reactive to light.   Pulmonary:      Effort: Pulmonary effort is normal.      Breath sounds: Normal breath sounds.   Abdominal:      Palpations: Abdomen is soft.      Tenderness: There is no abdominal tenderness.   Genitourinary:     Rectum: Guaiac result negative.   Musculoskeletal:         General: Normal range of motion.      Cervical back: Normal range of motion.   Skin:     General: Skin is warm and dry.   Neurological:      Mental Status: She is alert and oriented to person, place, and time.   Psychiatric:         Mood and Affect: Mood normal.         Procedures           ED Course                                           Results for orders placed or performed during the hospital encounter of 25   Comprehensive Metabolic Panel    Collection Time: 25 11:31 PM    Specimen: Blood   Result Value Ref Range    Glucose 121 (H) 65 - 99 mg/dL    BUN 15.7 8.0 - 23.0 mg/dL    Creatinine 0.95 0.57 - 1.00 mg/dL    Sodium 131 (L) 136 - 145 mmol/L    Potassium 4.1 3.5 - 5.2 mmol/L    Chloride 98 98 - 107 mmol/L    CO2 19.9 (L) 22.0  - 29.0 mmol/L    Calcium 8.8 8.6 - 10.5 mg/dL    Total Protein 6.5 6.0 - 8.5 g/dL    Albumin 3.3 (L) 3.5 - 5.2 g/dL    ALT (SGPT) 147 (H) 1 - 33 U/L    AST (SGOT) 146 (H) 1 - 32 U/L    Alkaline Phosphatase 219 (H) 39 - 117 U/L    Total Bilirubin 0.5 0.0 - 1.2 mg/dL    Globulin 3.2 gm/dL    A/G Ratio 1.0 g/dL    BUN/Creatinine Ratio 16.5 7.0 - 25.0    Anion Gap 13.1 5.0 - 15.0 mmol/L    eGFR 63.8 >60.0 mL/min/1.73   CBC Auto Differential    Collection Time: 06/17/25 11:31 PM    Specimen: Blood   Result Value Ref Range    WBC 16.86 (H) 3.40 - 10.80 10*3/mm3    RBC 3.32 (L) 3.77 - 5.28 10*6/mm3    Hemoglobin 9.8 (L) 12.0 - 15.9 g/dL    Hematocrit 29.7 (L) 34.0 - 46.6 %    MCV 89.5 79.0 - 97.0 fL    MCH 29.5 26.6 - 33.0 pg    MCHC 33.0 31.5 - 35.7 g/dL    RDW 13.9 12.3 - 15.4 %    RDW-SD 45.5 37.0 - 54.0 fl    MPV 9.4 6.0 - 12.0 fL    Platelets 581 (H) 140 - 450 10*3/mm3    Neutrophil % 74.9 42.7 - 76.0 %    Lymphocyte % 13.4 (L) 19.6 - 45.3 %    Monocyte % 10.3 5.0 - 12.0 %    Eosinophil % 0.6 0.3 - 6.2 %    Basophil % 0.3 0.0 - 1.5 %    Immature Grans % 0.5 0.0 - 0.5 %    Neutrophils, Absolute 12.62 (H) 1.70 - 7.00 10*3/mm3    Lymphocytes, Absolute 2.26 0.70 - 3.10 10*3/mm3    Monocytes, Absolute 1.74 (H) 0.10 - 0.90 10*3/mm3    Eosinophils, Absolute 0.10 0.00 - 0.40 10*3/mm3    Basophils, Absolute 0.05 0.00 - 0.20 10*3/mm3    Immature Grans, Absolute 0.09 (H) 0.00 - 0.05 10*3/mm3    nRBC 0.0 0.0 - 0.2 /100 WBC   Urinalysis With Culture If Indicated - Straight Cath    Collection Time: 06/17/25 11:41 PM    Specimen: Straight Cath; Urine   Result Value Ref Range    Color, UA Orange (A) Yellow, Straw    Appearance, UA Turbid (A) Clear    pH, UA 5.5 5.0 - 8.0    Specific Gravity, UA 1.020 1.005 - 1.030    Glucose, UA Negative Negative    Ketones, UA Trace (A) Negative    Bilirubin, UA Small (1+) (A) Negative    Blood, UA Large (3+) (A) Negative    Protein, UA >=300 mg/dL (3+) (A) Negative    Leuk Esterase, UA Moderate  (2+) (A) Negative    Nitrite, UA Positive (A) Negative    Urobilinogen, UA 4.0 E.U./dL (A) 0.2 - 1.0 E.U./dL   Urinalysis, Microscopic Only - Straight Cath    Collection Time: 06/17/25 11:41 PM    Specimen: Straight Cath; Urine   Result Value Ref Range    RBC, UA 21-50 (A) None Seen, 0-2 /HPF    WBC, UA Too Numerous to Count (A) None Seen, 0-2 /HPF    Bacteria, UA 3+ (A) None Seen /HPF    Squamous Epithelial Cells, UA 3-6 (A) None Seen, 0-2 /HPF    Hyaline Casts, UA 3-6 None Seen /LPF    Methodology Manual Light Microscopy           No radiology results for the last day         Medical Decision Making  Patient was seen evaluated with above problem    Differential diagnosis includes was not limited to sepsis, UTI, gastroenteritis, cholecystitis, postoperative complication    Patient has some generalized weakness and she has had dry heaves and nausea.  She presented with a fever.  She does have a UTI.  She has absolutely no abdominal pain.  Her liver tests are elevated.  Could be secondary to volume depletion.  With no abdominal pain it makes gallbladder disease less likely.  Her hemoglobin is 9.8.  Her last hemoglobin is 10.7.  Is actually down from her surgery.  However she has no chest pain.  No abdominal pain.  No shortness of breath.  Her guaiac was negative.  I spoke with  who agrees to admit the patient.  I did consider CT imaging but as the patient has normal blood pressure and no symptoms of chest pain or shortness of breath I did not obtain at this point in time.   states he will order CT as well as treat the patient's infection.      Problems Addressed:  Acute cystitis without hematuria: complicated acute illness or injury  Nausea and vomiting, unspecified vomiting type: complicated acute illness or injury    Amount and/or Complexity of Data Reviewed  Labs: ordered. Decision-making details documented in ED Course.     Details: Labs reviewed by myself  Radiology: ordered.    Risk  OTC  drugs.  Prescription drug management.  Decision regarding hospitalization.        Final diagnoses:   None   uti  Vomiting  Weakness        ED Disposition  ED Disposition       None            No follow-up provider specified.       Medication List      No changes were made to your prescriptions during this visit.            Karsten Zhou,   06/18/25 0138

## 2025-06-18 NOTE — THERAPY EVALUATION
Patient Name: Rebekah Li  : 1950    MRN: [id number]                              Today's Date: 2025       Admit Date: 2025    Visit Dx:     ICD-10-CM ICD-9-CM   1. Acute cystitis without hematuria  N30.00 595.0   2. Nausea and vomiting, unspecified vomiting type  R11.2 787.01     Patient Active Problem List   Diagnosis    Postoperative anemia     Past Medical History:   Diagnosis Date    Anxiety     Cataract     COPD (chronic obstructive pulmonary disease)     Depression     Diabetes mellitus     GERD (gastroesophageal reflux disease)     Hyperlipidemia     Hypertension     Obesity     Peptic ulceration      Past Surgical History:   Procedure Laterality Date    APPENDECTOMY      CARDIAC CATHETERIZATION      CARDIAC SURGERY      Aneurysm repair    COLONOSCOPY      HYSTERECTOMY      TUBAL ABDOMINAL LIGATION        General Information       Row Name 25 1059          Physical Therapy Time and Intention    Document Type evaluation  -AD     Mode of Treatment physical therapy  -AD       Row Name 25 1059          General Information    Patient Profile Reviewed yes  -AD     Prior Level of Function independent:;bed mobility;ADL's;all household mobility;community mobility;gait  with RW  -AD     Existing Precautions/Restrictions no known precautions/restrictions  -AD     Barriers to Rehab none identified  -AD       Row Name 25 1059          Living Environment    Current Living Arrangements home  -AD     People in Home child(kathya), adult  -AD       Row Name 25 1059          Home Main Entrance    Number of Stairs, Main Entrance five  -AD       Row Name 25 1059          Cognition    Orientation Status (Cognition) oriented x 4  -AD       Row Name 25 1059          Safety Issues/Impairments Affecting Functional Mobility    Impairments Affecting Function (Mobility) endurance/activity tolerance;shortness of breath  -AD               User Key  (r) = Recorded By, (t) = Taken By,  (c) = Cosigned By      Initials Name Provider Type    Christin Tamez, PT Physical Therapist                   Mobility       Row Name 06/18/25 1059          Bed Mobility    Bed Mobility bed mobility (all) activities  -AD     All Activities, [place] (Bed Mobility) independent  -AD     Assistive Device (Bed Mobility) bed rails;head of bed elevated  -AD       Row Name 06/18/25 1059          Sit-Stand Transfer    Sit-Stand [place] (Transfers) contact guard  -AD     Assistive Device (Sit-Stand Transfers) walker, front-wheeled  -AD       Row Name 06/18/25 1059          Gait/Stairs (Locomotion)    [place] Level (Gait) contact guard  -AD     Assistive Device (Gait) walker, front-wheeled  -AD     Patient was able to Ambulate yes  -AD     Distance in Feet (Gait) 10  -AD     Deviations/Abnormal Patterns (Gait) diana decreased  -AD               User Key  (r) = Recorded By, (t) = Taken By, (c) = Cosigned By      Initials Name Provider Type    Christin Tamez PT Physical Therapist                   Obj/Interventions       Row Name 06/18/25 1100          Range of Motion Comprehensive    General Range of Motion no range of motion deficits identified  -AD       Row Name 06/18/25 1100          Strength Comprehensive (MMT)    General Manual Muscle Testing (MMT) Assessment no strength deficits identified  -AD               User Key  (r) = Recorded By, (t) = Taken By, (c) = Cosigned By      Initials Name Provider Type    Christin Tamez PT Physical Therapist                   Goals/Plan       Row Name 06/18/25 1101          Bed Mobility Goal 1 (PT)    Activity/Assistive Device (Bed Mobility Goal 1, PT) bed mobility activities, all  -AD     [place] Level/Cues Needed (Bed Mobility Goal 1, PT) independent  -AD     Time Frame (Bed Mobility Goal 1, PT) long term goal (LTG)  -AD       Row Name 06/18/25 1101          Transfer Goal 1 (PT)    Activity/Assistive Device (Transfer Goal 1, PT) transfers, all   -AD     [place] Level/Cues Needed (Transfer Goal 1, PT) independent  -AD     Time Frame (Transfer Goal 1, PT) long term goal (LTG)  -AD       Row Name 06/18/25 1101          Gait Training Goal 1 (PT)    Activity/Assistive Device (Gait Training Goal 1, PT) gait (walking locomotion);assistive device use  -AD     [place] Level (Gait Training Goal 1, PT) modified independence  -AD     Distance (Gait Training Goal 1, PT) 100  -AD     Time Frame (Gait Training Goal 1, PT) long term goal (LTG)  -AD       Row Name 06/18/25 1101          Therapy Assessment/Plan (PT)    Planned Therapy Interventions (PT) balance training;bed mobility training;neuromuscular re-education;gait training;motor coordination training;stair training;strengthening;stretching;ROM (range of motion);transfer training;patient/family education;postural re-education  -AD               User Key  (r) = Recorded By, (t) = Taken By, (c) = Cosigned By      Initials Name Provider Type    AD Christin Steel, PT Physical Therapist                   Clinical Impression       Row Name 06/18/25 1100          Pain    Pretreatment Pain Rating 0/10 - no pain  -AD     Posttreatment Pain Rating 0/10 - no pain  -AD       Row Name 06/18/25 1100          Plan of Care Review    Plan of Care Reviewed With patient  -AD     Progress no change  -AD     Outcome Evaluation 74 y.o. female with a CMH of morbid obesity, hypertension, hyperlipidemia, type 2 diabetes mellitus, GERD/PUD, thoracic aortic aneurysm, depression/anxiety, former smoker/COPD who presented to [facility] on 6/17/20[facility] with fatigue and abnormal labs. Status post elective thoracic aortic aneurysm repair on 6/4 by Dr. Castillo at [facility]. Admitted for postop anemia d/t acute blood loss, UTI, hyponatremia  . PLOF is (I) with bathing / dressing, walks with RW all the time, and lives with her adult daughter who does the driving and shopping. She was (I) with bed mobility, CGA for ambulation and  transfers, and became SOA with O2 drop to 93% on RA after mobility. Recommend HHPT at DC as she is below baseline function with endurance, pt in agreement. Will follow on acute setting to help prevent decompensation.  -AD       Row Name 06/18/25 1100          Therapy Assessment/Plan (PT)    Patient/Family Therapy Goals Statement (PT) go home  -AD     Rehab Potential (PT) good  -AD     Criteria for Skilled Interventions Met (PT) yes;skilled treatment is necessary  -AD     Therapy Frequency (PT) 3 times/wk  -AD               User Key  (r) = Recorded By, (t) = Taken By, (c) = Cosigned By      Initials Name Provider Type    Christin Tamez PT Physical Therapist                   Outcome Measures       Row Name 06/18/25 1102 06/18/25 0524       How much help from another person do you currently need...    Turning from your back to your side while in flat bed without using bedrails? 4  -AD 4  -MC    Moving from lying on back to sitting on the side of a flat bed without bedrails? 4  -AD 4  -MC    Moving to and from a bed to a chair (including a wheelchair)? 4  -AD 4  -MC    Standing up from a chair using your arms (e.g., wheelchair, bedside chair)? 4  -AD 4  -MC    Climbing 3-5 steps with a railing? 3  -AD 4  -MC    To walk in hospital room? 3  -AD 3  -MC    AM-PAC 6 Clicks Score (PT) 22  -AD 23  -MC    Highest Level of Mobility Goal Walk 25 Feet or More-7  -AD Walk 25 Feet or More-7  -MC      Row Name 06/18/25 1102          Functional Assessment    Outcome Measure Options AM-PAC 6 Clicks Basic Mobility (PT)  -AD               User Key  (r) = Recorded By, (t) = Taken By, (c) = Cosigned By      Initials Name Provider Type    Christin Tamez PT Physical Therapist    Pat Conley, RN Registered Nurse                                 Physical Therapy Education       Title: PT OT SLP Therapies (In Progress)       Topic: Physical Therapy (In Progress)       Point: Mobility training (Done)       Learning Progress  Summary            Patient Acceptance, E, VU by AD at 6/18/2025 1102                      Point: Home exercise program (Not Started)       Learner Progress:  Not documented in this visit.              Point: Body mechanics (Not Started)       Learner Progress:  Not documented in this visit.              Point: Precautions (Not Started)       Learner Progress:  Not documented in this visit.                              User Key       Initials Effective Dates Name Provider Type Discipline    AD 04/24/25 -  Christin Steel, PT Physical Therapist PT                  PT Recommendation and Plan  Planned Therapy Interventions (PT): balance training, bed mobility training, neuromuscular re-education, gait training, motor coordination training, stair training, strengthening, stretching, ROM (range of motion), transfer training, patient/family education, postural re-education  Progress: no change  Outcome Evaluation: 74 y.o. female with a CMH of morbid obesity, hypertension, hyperlipidemia, type 2 diabetes mellitus, GERD/PUD, thoracic aortic aneurysm, depression/anxiety, former smoker/COPD who presented to [facility] on 6/17/2025 with fatigue and abnormal labs. Status post elective thoracic aortic aneurysm repair on 6/4 by Dr. Castillo at [facility]. Admitted for postop anemia d/t acute blood loss, UTI, hyponatremia  . PLOF is (I) with bathing / dressing, walks with RW all the time, and lives with her adult daughter who does the driving and shopping. She was (I) with bed mobility, CGA for ambulation and transfers, and became SOA with O2 drop to 93% on RA after mobility. Recommend HHPT at DC as she is below baseline function with endurance, pt in agreement. Will follow on acute setting to help prevent decompensation.     Time Calculation:   PT Evaluation Complexity  History, PT Evaluation Complexity: 1-2 personal factors and/or comorbidities  Examination of Body Systems (PT Eval Complexity): 1-2  elements  Clinical Presentation (PT Evaluation Complexity): stable  Clinical Decision Making (PT Evaluation Complexity): low complexity  Overall Complexity (PT Evaluation Complexity): low complexity     PT Charges       Row Name 06/18/25 1058             Time Calculation    Start Time 1017  -AD      Stop Time 1037  -AD      Time Calculation (min) 20 min  -AD      PT Received On 06/18/25  -AD      PT - Next Appointment 06/20/25  -AD      PT Goal Re-Cert Due Date 07/02/25  -AD         Time Calculation- PT    Total Timed Code Minutes- PT 0 minute(s)  -AD                User Key  (r) = Recorded By, (t) = Taken By, (c) = Cosigned By      Initials Name Provider Type    AD Christin Steel, PT Physical Therapist                  Therapy Charges for Today       Code Description Service Date Service Provider Modifiers Qty    07283288771 HC PT EVAL LOW COMPLEXITY 4 6/18/2025 Christin Steel, PT GP 1            PT G-Codes  Outcome Measure Options: AM-PAC 6 Clicks Basic Mobility (PT)  AM-PAC 6 Clicks Score (PT): 22  PT Discharge Summary  Anticipated Discharge Disposition (PT): home with home health    Christin Steel, PT  6/18/2025

## 2025-06-18 NOTE — PLAN OF CARE
Goal Outcome Evaluation:  Plan of Care Reviewed With: patient        Progress: no change  Outcome Evaluation: 74 y.o. female with a CMH of morbid obesity, hypertension, hyperlipidemia, type 2 diabetes mellitus, GERD/PUD, thoracic aortic aneurysm, depression/anxiety, former smoker/COPD who presented to [facility] on 6/17/2025 with fatigue and abnormal labs. Status post elective thoracic aortic aneurysm repair on 6/4 by Dr. Castillo at [facility]. Admitted for postop anemia d/t acute blood loss, UTI, hyponatremia  . PLOF is (I) with bathing / dressing, walks with RW all the time, and lives with her adult daughter who does the driving and shopping. She was (I) with bed mobility, CGA for ambulation and transfers, and became SOA with O2 drop to 93% on RA after mobility. Recommend HHPT at DC as she is below baseline function with endurance, pt in agreement. Will follow on acute setting to help prevent decompensation.    Anticipated Discharge Disposition (PT): home with home health

## 2025-06-18 NOTE — PLAN OF CARE
Goal Outcome Evaluation:         Pt. Up ad-jeanine to and from bathroom this date, refusing therapy services at d/c. Does not require skilled OT at this time.

## 2025-06-18 NOTE — H&P
[facility] Medicine Services  History & Physical    Patient Name: Rebekah Li  : 1950  MRN: [id number]  Primary Care Physician:  Gogo Eaton PA-C  Date of admission: 2025  Date and Time of Service: 2025 at 1:26 AM     Subjective      Chief Complaint: Fatigue    History of Present Illness: Rebekah Li is a 74 y.o. female with a CMH of morbid obesity, hypertension, hyperlipidemia, type 2 diabetes mellitus, GERD/PUD, thoracic aortic aneurysm, depression/anxiety, former smoker/COPD who presented to [facility] on 2025 with fatigue and abnormal labs.    Status post elective thoracic aortic aneurysm repair on  by Dr. Castillo at [facility].    She presented to ED with fatigue, subjective fever/chills, nausea/dry heaves, poor p.o. intake, dyspnea on minimal exertion.  Denies any epistaxis, hemoptysis, hematemesis, melena, gross hematuria, vaginal bleeding, rectal bleed, excessive NSAID or alcohol use, chest pain or syncope.  Currently on aspirin and Plavix    Febrile in ED temp 102, tachycardic heart rate 110s  Labs showed hyponatremia, elevated liver enzymes, leukocytosis, hemoglobin 9.8 (11.2 on )    She received Tylenol, Protonix, Zofran 4 mg I.V. and 2 g Rocephin      Review of Systems negative except as mention HPI    Personal History     Past Medical History:   Diagnosis Date    Anxiety     Cataract     COPD (chronic obstructive pulmonary disease)     Depression     Diabetes mellitus     GERD (gastroesophageal reflux disease)     Hyperlipidemia     Hypertension     Obesity     Peptic ulceration        Past Surgical History:   Procedure Laterality Date    APPENDECTOMY      CARDIAC CATHETERIZATION      CARDIAC SURGERY      Aneurysm repair    COLONOSCOPY      HYSTERECTOMY      TUBAL ABDOMINAL LIGATION         Family History: family history includes Anxiety disorder in her brother and daughter; Arthritis in her mother; Depression in her daughter and son;  Diabetes in her brother, brother, and daughter; Heart disease in her brother and father; Hypertension in her brother, brother, daughter, father, and mother; Stroke in her father. Otherwise pertinent FHx was reviewed and not pertinent to current issue.    Social History:  reports that she quit smoking about 37 years ago. Her smoking use included cigarettes. She started smoking about 48 years ago. She has a 20.2 pack-year smoking history. She has never used smokeless tobacco. She reports current alcohol use of about 1.0 standard drink of alcohol per week. She reports that she does not use drugs.    Home Medications:  Prior to Admission Medications       Prescriptions Last Dose Informant Patient Reported? Taking?    albuterol (ACCUNEB) 1.25 MG/3ML nebulizer solution   Yes No    Take 3 mL by nebulization Every 4 (Four) Hours As Needed for Wheezing.    albuterol sulfate  (90 Base) MCG/ACT inhaler   Yes No    Inhale 2 puffs Every 4 (Four) Hours As Needed for Shortness of Air.    Albuterol Sulfate, sensor, (ProAir Digihaler) 108 (90 Base) MCG/ACT aerosol powder    Yes No    amLODIPine (NORVASC) 2.5 MG tablet   Yes No    Take 1 tablet by mouth Daily.    ammonium lactate (AMLACTIN) 12 % cream   Yes No    Apply 12 doses topically to the appropriate area as directed.    aspirin 81 MG EC tablet   Yes No    Take 1 tablet by mouth Daily.    buPROPion XL (WELLBUTRIN XL) 150 MG 24 hr tablet   No No    Take 1 tablet by mouth Daily.    clopidogrel (PLAVIX) 75 MG tablet   Yes No    Take 1 tablet by mouth Daily.    famotidine (PEPCID) 40 MG tablet   Yes No    Take 1 tablet by mouth Daily.    furosemide (LASIX) 40 MG tablet   Yes No    Take 1 tablet by mouth Daily.    lisinopril (PRINIVIL,ZESTRIL) 40 MG tablet   No No    Take 1 tablet by mouth Daily.    loratadine (CLARITIN) 10 MG tablet   No No    Take 1 tablet by mouth Daily.    metFORMIN (GLUCOPHAGE) 500 MG tablet   No No    Take 1 tablet by mouth 2 (Two) Times a Day With  Meals.    metoprolol tartrate (LOPRESSOR) 50 MG tablet   Yes No    Take 1 tablet by mouth 2 (Two) Times a Day.    Patient taking differently:  Take 0.5 tablets by mouth 2 (Two) Times a Day.    ondansetron ODT (ZOFRAN-ODT) 4 MG disintegrating tablet   No No    Place 1 tablet on the tongue Every 12 (Twelve) Hours As Needed for Nausea or Vomiting.    pantoprazole (PROTONIX) 40 MG EC tablet   Yes No    Take 1 tablet by mouth Daily.    potassium chloride (KLOR-CON M20) 20 MEQ CR tablet   Yes No    Take 1 tablet by mouth Daily.    rosuvastatin (CRESTOR) 20 MG tablet   Yes No    Take 1 tablet by mouth Daily.    sertraline (ZOLOFT) 50 MG tablet   Yes No    Take 1 tablet by mouth Daily.    traMADol (ULTRAM) 50 MG tablet   Yes No    Take 1 tablet by mouth.    varenicline (CHANTIX) 1 MG tablet   No No    Take 1 tablet by mouth 2 (Two) Times a Day.              Allergies:  No Known Allergies    Objective      Vitals:   Temp:  [99.7 °F (37.6 °C)-102 °F (38.9 °C)] 102 °F (38.9 °C)  Heart Rate:  [107] 107  Resp:  [22] 22  BP: (121)/(64) 121/64  Body mass index is 40.19 kg/m².  Physical Exam  Constitutional:       Appearance: Normal appearance.   HENT:      Head: Normocephalic and atraumatic.   Eyes:      Pupils: Pupils are equal, round, and reactive to light.   Cardiovascular:      Rate and Rhythm: Tachycardia present. Rhythm irregular.      Pulses: Normal pulses.      Heart sounds: Normal heart sounds.   Pulmonary:      Breath sounds: Wheezing present.   Abdominal:      General: Abdomen is flat. Bowel sounds are normal.      Palpations: Abdomen is soft.   Musculoskeletal:         General: Normal range of motion.   Skin:     General: Skin is warm.      Capillary Refill: Capillary refill takes 2 to 3 seconds.   Neurological:      General: No focal deficit present.      Mental Status: She is alert.   Psychiatric:         Mood and Affect: Mood normal.         Diagnostic Data:  Lab Results (last 24 hours)       Procedure Component  Value Units Date/Time    Procalcitonin [[id number]] Collected: 06/17/25 2331    Specimen: Blood Updated: 06/18/25 0105    Urinalysis, Microscopic Only - Straight Cath [[id number]]  (Abnormal) Collected: 06/17/25 2341    Specimen: Urine from Straight Cath Updated: 06/18/25 0023     RBC, UA 21-50 /HPF      WBC, UA Too Numerous to Count /HPF      Bacteria, UA 3+ /HPF      Squamous Epithelial Cells, UA 3-6 /HPF      Hyaline Casts, UA 3-6 /LPF      Methodology Manual Light Microscopy    Urine Culture - Urine, Straight Cath [[id number]] Collected: 06/17/25 2341    Specimen: Urine from Straight Cath Updated: 06/18/25 0023    Comprehensive Metabolic Panel [[id number]]  (Abnormal) Collected: 06/17/25 2331    Specimen: Blood Updated: 06/18/25 0004     Glucose 121 mg/dL      BUN 15.7 mg/dL      Creatinine 0.95 mg/dL      Sodium 131 mmol/L      Potassium 4.1 mmol/L      Chloride 98 mmol/L      CO2 19.9 mmol/L      Calcium 8.8 mg/dL      Total Protein 6.5 g/dL      Albumin 3.3 g/dL      ALT (SGPT) 147 U/L      AST (SGOT) 146 U/L      Alkaline Phosphatase 219 U/L      Total Bilirubin 0.5 mg/dL      Globulin 3.2 gm/dL      A/G Ratio 1.0 g/dL      BUN/Creatinine Ratio 16.5     Anion Gap 13.1 mmol/L      eGFR 63.8 mL/min/1.73     Narrative:      GFR Categories in Chronic Kidney Disease (CKD)              GFR Category          GFR (mL/min/1.73)    Interpretation  G1                    90 or greater        Normal or high (1)  G2                    60-89                Mild decrease (1)  G3a                   45-59                Mild to moderate decrease  G3b                   30-44                Moderate to severe decrease  G4                    15-29                Severe decrease  G5                    14 or less           Kidney failure    (1)In the absence of evidence of kidney disease, neither GFR category G1 or G2 fulfill the criteria for CKD.    eGFR calculation 2021 CKD-EPI creatinine equation, which does not include race as a  factor    Urinalysis With Culture If Indicated - Straight Cath [[id number]]  (Abnormal) Collected: 06/17/25 2341    Specimen: Urine from Straight Cath Updated: 06/17/25 2354     Color, UA Orange     Comment: Any Substance that causes an abnormal urine color can alter the accuracy of the chemical reactions.        Appearance, UA Turbid     pH, UA 5.5     Specific Gravity, UA 1.020     Glucose, UA Negative     Ketones, UA Trace     Bilirubin, UA Small (1+)     Comment: Confirmation testing is unavailable.  A serum bilirubin is recommended for further assessment.        Blood, UA Large (3+)     Protein, UA >=300 mg/dL (3+)     Leuk Esterase, UA Moderate (2+)     Nitrite, UA Positive     Urobilinogen, UA 4.0 E.U./dL    Narrative:      In absence of clinical symptoms, the presence of pyuria, bacteria, and/or nitrites on the urinalysis result does not correlate with infection.    CBC & Differential [[id number]]  (Abnormal) Collected: 06/17/25 2331    Specimen: Blood Updated: 06/17/25 2341    Narrative:      The following orders were created for panel order CBC & Differential.  Procedure                               Abnormality         Status                     ---------                               -----------         ------                     CBC Auto Differential[[id number]]        Abnormal            Final result                 Please view results for these tests on the individual orders.    CBC Auto Differential [[id number]]  (Abnormal) Collected: 06/17/25 2331    Specimen: Blood Updated: 06/17/25 2341     WBC 16.86 10*3/mm3      RBC 3.32 10*6/mm3      Hemoglobin 9.8 g/dL      Hematocrit 29.7 %      MCV 89.5 fL      MCH 29.5 pg      MCHC 33.0 g/dL      RDW 13.9 %      RDW-SD 45.5 fl      MPV 9.4 fL      Platelets 581 10*3/mm3      Neutrophil % 74.9 %      Lymphocyte % 13.4 %      Monocyte % 10.3 %      Eosinophil % 0.6 %      Basophil % 0.3 %      Immature Grans % 0.5 %      Neutrophils, Absolute 12.62 10*3/mm3       Lymphocytes, Absolute 2.26 10*3/mm3      Monocytes, Absolute 1.74 10*3/mm3      Eosinophils, Absolute 0.10 10*3/mm3      Basophils, Absolute 0.05 10*3/mm3      Immature Grans, Absolute 0.09 10*3/mm3      nRBC 0.0 /100 WBC              Imaging Results (Last 24 Hours)       ** No results found for the last 24 hours. **              Assessment & Plan      Active and Resolved Problems  Postoperative anemia due to acute blood loss  Acute UTI  Hyponatremia due to hypovolemia  Elevated liver enzymes   Status post thoracic aortic aneurysm repair   Hypertension  Type 2 diabetes mellitus    Plan:  Start empiric Rocephin  Follow-up blood and urine culture, check lactic acid   IV fluid  No evidence of active GI bleed, FOBT negative  Avoid hepatotoxic drugs, transaminitis workup ordered  Daily CBC, transfuse as needed  Workup for anemia ordered  CT chest abdomen pelvis with contrast to evaluate for postop hematoma  Hold oral agent, insulin sliding scale, hemoglobin A1c  PT/OT, fall precautions  Consult case management        VTE Prophylaxis:  Pharmacologic VTE prophylaxis orders are present.        The patient desires to be as follows:    CODE STATUS:    Code Status (Patient has no pulse and is not breathing): CPR (Attempt to Resuscitate)  Medical Interventions (Patient has pulse or is breathing): Full Support          Admission Status:  I believe this patient meets in patient status.    Expected Length of Stay: greater than 2 midnight stay    PDMP and Medication Dispenses via Sidebar reviewed and consistent with patient reported medications.    I discussed the patient's findings and my recommendations with patient and nursing staff.      Signature:     This document has been electronically signed by Epifanio Ashford MD on June 18, 2025 01:26 EDT   [facility] Hospitalist Team

## 2025-06-19 LAB
ALBUMIN SERPL-MCNC: 3 G/DL (ref 3.5–5.2)
ALP SERPL-CCNC: 275 U/L (ref 39–117)
ALT SERPL W P-5'-P-CCNC: 132 U/L (ref 1–33)
ANION GAP SERPL CALCULATED.3IONS-SCNC: 9.1 MMOL/L (ref 5–15)
AST SERPL-CCNC: 113 U/L (ref 1–32)
BACTERIA SPEC AEROBE CULT: ABNORMAL
BASOPHILS # BLD AUTO: 0.04 10*3/MM3 (ref 0–0.2)
BASOPHILS NFR BLD AUTO: 0.3 % (ref 0–1.5)
BILIRUB CONJ SERPL-MCNC: 0.2 MG/DL (ref 0–0.3)
BILIRUB INDIRECT SERPL-MCNC: 0.2 MG/DL
BILIRUB SERPL-MCNC: 0.4 MG/DL (ref 0–1.2)
BUN SERPL-MCNC: 12.2 MG/DL (ref 8–23)
BUN/CREAT SERPL: 13.3 (ref 7–25)
CALCIUM SPEC-SCNC: 8.8 MG/DL (ref 8.6–10.5)
CHLORIDE SERPL-SCNC: 104 MMOL/L (ref 98–107)
CO2 SERPL-SCNC: 22.9 MMOL/L (ref 22–29)
CREAT SERPL-MCNC: 0.92 MG/DL (ref 0.57–1)
DEPRECATED RDW RBC AUTO: 47.6 FL (ref 37–54)
EGFRCR SERPLBLD CKD-EPI 2021: 66.3 ML/MIN/1.73
EOSINOPHIL # BLD AUTO: 0.15 10*3/MM3 (ref 0–0.4)
EOSINOPHIL NFR BLD AUTO: 1.1 % (ref 0.3–6.2)
ERYTHROCYTE [DISTWIDTH] IN BLOOD BY AUTOMATED COUNT: 14.1 % (ref 12.3–15.4)
GLUCOSE BLDC GLUCOMTR-MCNC: 120 MG/DL (ref 70–105)
GLUCOSE BLDC GLUCOMTR-MCNC: 127 MG/DL (ref 70–105)
GLUCOSE BLDC GLUCOMTR-MCNC: 129 MG/DL (ref 70–105)
GLUCOSE BLDC GLUCOMTR-MCNC: 135 MG/DL (ref 70–105)
GLUCOSE SERPL-MCNC: 125 MG/DL (ref 65–99)
HCT VFR BLD AUTO: 32.7 % (ref 34–46.6)
HGB BLD-MCNC: 10.1 G/DL (ref 12–15.9)
IMM GRANULOCYTES # BLD AUTO: 0.07 10*3/MM3 (ref 0–0.05)
IMM GRANULOCYTES NFR BLD AUTO: 0.5 % (ref 0–0.5)
LYMPHOCYTES # BLD AUTO: 2.41 10*3/MM3 (ref 0.7–3.1)
LYMPHOCYTES NFR BLD AUTO: 17 % (ref 19.6–45.3)
MAGNESIUM SERPL-MCNC: 2.1 MG/DL (ref 1.6–2.4)
MCH RBC QN AUTO: 28.6 PG (ref 26.6–33)
MCHC RBC AUTO-ENTMCNC: 30.9 G/DL (ref 31.5–35.7)
MCV RBC AUTO: 92.6 FL (ref 79–97)
MONOCYTES # BLD AUTO: 1.17 10*3/MM3 (ref 0.1–0.9)
MONOCYTES NFR BLD AUTO: 8.2 % (ref 5–12)
NEUTROPHILS NFR BLD AUTO: 10.35 10*3/MM3 (ref 1.7–7)
NEUTROPHILS NFR BLD AUTO: 72.9 % (ref 42.7–76)
NRBC BLD AUTO-RTO: 0 /100 WBC (ref 0–0.2)
PLATELET # BLD AUTO: 471 10*3/MM3 (ref 140–450)
PMV BLD AUTO: 9 FL (ref 6–12)
POTASSIUM SERPL-SCNC: 3.7 MMOL/L (ref 3.5–5.2)
PROT SERPL-MCNC: 6.2 G/DL (ref 6–8.5)
RBC # BLD AUTO: 3.53 10*6/MM3 (ref 3.77–5.28)
SODIUM SERPL-SCNC: 136 MMOL/L (ref 136–145)
WBC NRBC COR # BLD AUTO: 14.19 10*3/MM3 (ref 3.4–10.8)

## 2025-06-19 PROCEDURE — 80048 BASIC METABOLIC PNL TOTAL CA: CPT | Performed by: HOSPITALIST

## 2025-06-19 PROCEDURE — 85025 COMPLETE CBC W/AUTO DIFF WBC: CPT | Performed by: HOSPITALIST

## 2025-06-19 PROCEDURE — 25010000002 ERTAPENEM PER 500 MG: Performed by: INTERNAL MEDICINE

## 2025-06-19 PROCEDURE — 82948 REAGENT STRIP/BLOOD GLUCOSE: CPT | Performed by: HOSPITALIST

## 2025-06-19 PROCEDURE — 82948 REAGENT STRIP/BLOOD GLUCOSE: CPT

## 2025-06-19 PROCEDURE — 25010000002 MEROPENEM PER 100 MG: Performed by: INTERNAL MEDICINE

## 2025-06-19 PROCEDURE — 80076 HEPATIC FUNCTION PANEL: CPT | Performed by: HOSPITALIST

## 2025-06-19 PROCEDURE — 83735 ASSAY OF MAGNESIUM: CPT | Performed by: HOSPITALIST

## 2025-06-19 RX ORDER — ASPIRIN 81 MG/1
81 TABLET ORAL DAILY
Status: DISCONTINUED | OUTPATIENT
Start: 2025-06-19 | End: 2025-06-20 | Stop reason: HOSPADM

## 2025-06-19 RX ORDER — DIPHENOXYLATE HYDROCHLORIDE AND ATROPINE SULFATE 2.5; .025 MG/1; MG/1
1 TABLET ORAL DAILY
Status: DISCONTINUED | OUTPATIENT
Start: 2025-06-19 | End: 2025-06-20 | Stop reason: HOSPADM

## 2025-06-19 RX ORDER — TRAMADOL HYDROCHLORIDE 50 MG/1
50 TABLET ORAL EVERY 6 HOURS PRN
Status: DISCONTINUED | OUTPATIENT
Start: 2025-06-19 | End: 2025-06-20 | Stop reason: HOSPADM

## 2025-06-19 RX ORDER — CLOPIDOGREL BISULFATE 75 MG/1
75 TABLET ORAL DAILY
Status: DISCONTINUED | OUTPATIENT
Start: 2025-06-19 | End: 2025-06-20 | Stop reason: HOSPADM

## 2025-06-19 RX ORDER — ROSUVASTATIN CALCIUM 10 MG/1
20 TABLET, COATED ORAL DAILY
Status: DISCONTINUED | OUTPATIENT
Start: 2025-06-19 | End: 2025-06-20 | Stop reason: HOSPADM

## 2025-06-19 RX ORDER — METOPROLOL TARTRATE 25 MG/1
25 TABLET, FILM COATED ORAL 2 TIMES DAILY
Status: DISCONTINUED | OUTPATIENT
Start: 2025-06-19 | End: 2025-06-20 | Stop reason: HOSPADM

## 2025-06-19 RX ORDER — AMLODIPINE BESYLATE 2.5 MG/1
2.5 TABLET ORAL DAILY
Status: DISCONTINUED | OUTPATIENT
Start: 2025-06-19 | End: 2025-06-20 | Stop reason: HOSPADM

## 2025-06-19 RX ORDER — FAMOTIDINE 20 MG/1
40 TABLET, FILM COATED ORAL DAILY
Status: DISCONTINUED | OUTPATIENT
Start: 2025-06-19 | End: 2025-06-20 | Stop reason: HOSPADM

## 2025-06-19 RX ORDER — BUPROPION HYDROCHLORIDE 150 MG/1
150 TABLET ORAL DAILY
Status: DISCONTINUED | OUTPATIENT
Start: 2025-06-19 | End: 2025-06-20 | Stop reason: HOSPADM

## 2025-06-19 RX ADMIN — BUPROPION HYDROCHLORIDE 150 MG: 150 TABLET, EXTENDED RELEASE ORAL at 09:36

## 2025-06-19 RX ADMIN — THERA TABS 1 TABLET: TAB at 09:36

## 2025-06-19 RX ADMIN — MEROPENEM 1000 MG: 1 INJECTION INTRAVENOUS at 05:01

## 2025-06-19 RX ADMIN — ERTAPENEM SODIUM 1000 MG: 1 INJECTION, POWDER, LYOPHILIZED, FOR SOLUTION INTRAMUSCULAR; INTRAVENOUS at 17:47

## 2025-06-19 RX ADMIN — Medication 10 ML: at 21:01

## 2025-06-19 RX ADMIN — ASPIRIN 81 MG: 81 TABLET, COATED ORAL at 09:36

## 2025-06-19 RX ADMIN — METOPROLOL TARTRATE 25 MG: 25 TABLET, FILM COATED ORAL at 09:36

## 2025-06-19 RX ADMIN — Medication 10 ML: at 08:24

## 2025-06-19 RX ADMIN — SERTRALINE HYDROCHLORIDE 50 MG: 50 TABLET, FILM COATED ORAL at 09:36

## 2025-06-19 RX ADMIN — METOPROLOL TARTRATE 25 MG: 25 TABLET, FILM COATED ORAL at 21:01

## 2025-06-19 RX ADMIN — AMLODIPINE BESYLATE 2.5 MG: 2.5 TABLET ORAL at 09:36

## 2025-06-19 RX ADMIN — FAMOTIDINE 40 MG: 20 TABLET, FILM COATED ORAL at 09:36

## 2025-06-19 RX ADMIN — ROSUVASTATIN CALCIUM 20 MG: 10 TABLET, FILM COATED ORAL at 09:36

## 2025-06-19 RX ADMIN — CLOPIDOGREL BISULFATE 75 MG: 75 TABLET, FILM COATED ORAL at 09:36

## 2025-06-19 NOTE — CONSULTS
Infectious Diseases Consult Note    Referring Provider: Brammell, Timothy Duane,*    Reason for Consultation: Bacteremia    Patient Care Team:  Gogo Eaton PA-C as PCP - General (Family Medicine)    Chief complaint presented with nausea and weakness    Subjective     History of present illness:      This is 74-year-old female who was hospitalized [facility] on June 17, 2025 with complaint of weakness and nausea.  Patient was found to have bacteremia with ESBL  E. coli.  Urine culture was also positive for the same organism.  The patient had a CT scan of the abdomen and pelvis with contrast which showed stranding around the right kidney consistent with large right-sided pyelonephritis.    The patient underwent a recent ascending aorta aneurysm repair at [facility] earlier this month.  The CT scan of the chest showed postsurgical changes with no focal collection.    Review of Systems   Review of Systems   Constitutional: Negative.    HENT: Negative.     Eyes: Negative.    Respiratory: Negative.     Cardiovascular: Negative.    Gastrointestinal:  Positive for nausea.   Genitourinary: Negative.    Musculoskeletal: Negative.    Skin: Negative.    Neurological: Negative.    Hematological: Negative.    Psychiatric/Behavioral: Negative.         Medications  Medications Prior to Admission   Medication Sig Dispense Refill Last Dose/Taking    albuterol (ACCUNEB) 1.25 MG/3ML nebulizer solution Take 3 mL by nebulization Every 4 (Four) Hours As Needed for Wheezing.   Taking As Needed    albuterol sulfate  (90 Base) MCG/ACT inhaler Inhale 2 puffs Every 4 (Four) Hours As Needed for Shortness of Air.   Taking As Needed    amLODIPine (NORVASC) 2.5 MG tablet Take 1 tablet by mouth Daily.   6/17/2025 Morning    ammonium lactate (AMLACTIN) 12 % cream Apply 12 doses topically to the appropriate area as directed.   Taking    aspirin 81 MG EC tablet Take 1 tablet by mouth Daily.   6/17/2025 Morning     buPROPion XL (WELLBUTRIN XL) 150 MG 24 hr tablet Take 1 tablet by mouth Daily. 30 tablet 2 6/17/2025 Morning    clopidogrel (PLAVIX) 75 MG tablet Take 1 tablet by mouth Daily.   6/17/2025 Morning    famotidine (PEPCID) 40 MG tablet Take 1 tablet by mouth Daily.   6/17/2025 Morning    loratadine (CLARITIN) 10 MG tablet Take 1 tablet by mouth Daily. 90 tablet 0 6/17/2025    metFORMIN (GLUCOPHAGE) 500 MG tablet Take 1 tablet by mouth 2 (Two) Times a Day With Meals. 180 tablet 0 6/17/2025 Morning    metoprolol tartrate (LOPRESSOR) 25 MG tablet Take 1 tablet by mouth 2 (Two) Times a Day.   6/17/2025 Morning    ondansetron ODT (ZOFRAN-ODT) 4 MG disintegrating tablet Place 1 tablet on the tongue Every 12 (Twelve) Hours As Needed for Nausea or Vomiting. 20 tablet 0 Taking As Needed    pantoprazole (PROTONIX) 40 MG EC tablet Take 1 tablet by mouth Daily.   6/17/2025 Morning    potassium chloride (KLOR-CON M20) 20 MEQ CR tablet Take 1 tablet by mouth Daily.   6/17/2025 Morning    rosuvastatin (CRESTOR) 20 MG tablet Take 1 tablet by mouth Daily.   6/17/2025 Morning    sertraline (ZOLOFT) 50 MG tablet Take 1 tablet by mouth Daily.   6/17/2025 Morning    traMADol (ULTRAM) 50 MG tablet Take 1 tablet by mouth Every 6 (Six) Hours As Needed for Moderate Pain.   Taking As Needed    varenicline (CHANTIX) 1 MG tablet Take 1 tablet by mouth 2 (Two) Times a Day. 56 tablet 0 6/17/2025 Morning       History  Past Medical History:   Diagnosis Date    Anxiety     Cataract     COPD (chronic obstructive pulmonary disease)     Depression     Diabetes mellitus     GERD (gastroesophageal reflux disease)     Hyperlipidemia     Hypertension     Obesity     Peptic ulceration      Past Surgical History:   Procedure Laterality Date    APPENDECTOMY      CARDIAC CATHETERIZATION      CARDIAC SURGERY      Aneurysm repair    COLONOSCOPY      HYSTERECTOMY      TUBAL ABDOMINAL LIGATION         Family History  Family History   Problem Relation Age of Onset     Hypertension Mother             Arthritis Mother     Hypertension Father     Stroke Father     Heart disease Father             Hypertension Brother     Heart disease Brother     Diabetes Brother             Anxiety disorder Brother     Depression Daughter     Diabetes Daughter     Anxiety disorder Daughter     Hypertension Daughter     Depression Son             Diabetes Brother             Hypertension Brother        Social History   reports that she quit smoking about 37 years ago. Her smoking use included cigarettes. She started smoking about 48 years ago. She has a 20.2 pack-year smoking history. She has never used smokeless tobacco. She reports current alcohol use of about 1.0 standard drink of alcohol per week. She reports that she does not use drugs.    Allergies  Patient has no known allergies.    Objective     Vital Signs   Vital Signs (last 24 hours)          0700   0659  0700   1352   Most Recent      Temp (°F) 97.9 -  100.5    98.4 -  98.5     98.4 (36.9)  1149    Heart Rate 85 -  118    75 -  93     75  1149    Resp 15 -  32    16 -  28     16  1149    /69 -  148/75    112/64 -  126/66     112/64  1149    SpO2 (%) 92 -  97    91 -  96     91  1149    Flow (L/min) (Oxygen Therapy)   2      2     2  0828            Physical Exam:  Physical Exam  Vitals and nursing note reviewed.   Constitutional:       Appearance: She is well-developed.   HENT:      Head: Normocephalic and atraumatic.   Eyes:      Pupils: Pupils are equal, round, and reactive to light.   Cardiovascular:      Rate and Rhythm: Normal rate and regular rhythm.      Heart sounds: Normal heart sounds.   Pulmonary:      Effort: Pulmonary effort is normal. No respiratory distress.      Breath sounds: Normal breath sounds. No wheezing or rales.   Abdominal:      General: Bowel sounds are normal. There is no distension.      Palpations: Abdomen is soft.  There is no mass.      Tenderness: There is no abdominal tenderness. There is no guarding or rebound.   Musculoskeletal:         General: No deformity. Normal range of motion.      Cervical back: Normal range of motion and neck supple.   Skin:     General: Skin is warm.      Findings: No erythema or rash.      Comments: Sternal wound is healing nicely with no signs of dehiscence, erythema or drainage   Neurological:      Mental Status: She is alert and oriented to person, place, and time.      Cranial Nerves: No cranial nerve deficit.         Microbiology  Microbiology Results (last 10 days)       Procedure Component Value - Date/Time    Respiratory Panel PCR w/COVID-19(SARS-CoV-2) JAZIEL/JUANITA/ARNAUD/PAD/COR/RODRIGO In-House, NP Swab in UTM/VTM, 2 HR TAT - Swab, Nasopharynx [[id number]]  (Normal) Collected: 06/18/25 0225    Lab Status: Final result Specimen: Swab from Nasopharynx Updated: 06/18/25 0323     ADENOVIRUS, PCR Not Detected     Coronavirus 229E Not Detected     Coronavirus HKU1 Not Detected     Coronavirus NL63 Not Detected     Coronavirus OC43 Not Detected     COVID19 Not Detected     Human Metapneumovirus Not Detected     Human Rhinovirus/Enterovirus Not Detected     Influenza A PCR Not Detected     Influenza B PCR Not Detected     Parainfluenza Virus 1 Not Detected     Parainfluenza Virus 2 Not Detected     Parainfluenza Virus 3 Not Detected     Parainfluenza Virus 4 Not Detected     RSV, PCR Not Detected     Bordetella pertussis pcr Not Detected     Bordetella parapertussis PCR Not Detected     Chlamydophila pneumoniae PCR Not Detected     Mycoplasma pneumo by PCR Not Detected    Narrative:      In the setting of a positive respiratory panel with a viral infection PLUS a negative procalcitonin without other underlying concern for bacterial infection, consider observing off antibiotics or discontinuation of antibiotics and continue supportive care. If the respiratory panel is positive for atypical bacterial  infection (Bordetella pertussis, Chlamydophila pneumoniae, or Mycoplasma pneumoniae), consider antibiotic de-escalation to target atypical bacterial infection.    Blood Culture - Blood, Arm, Right [[id number]]  (Abnormal) Collected: 06/18/25 0224    Lab Status: Preliminary result Specimen: Blood from Arm, Right Updated: 06/19/25 0630     Blood Culture Escherichia coli     Isolated from Anaerobic Bottle     Gram Stain Anaerobic Bottle Gram negative bacilli    Blood Culture ID, PCR - Blood, Arm, Right [[id number]]  (Abnormal) Collected: 06/18/25 0224    Lab Status: Final result Specimen: Blood from Arm, Right Updated: 06/18/25 1818     BCID, PCR Escherichia coli. Identification by BCID2 PCR.     BCID, PCR 2 CTX-M (ESBL) detected. Identification by BCID2 PCR     BOTTLE TYPE Anaerobic Bottle    Blood Culture - Blood, Arm, Right [[id number]]  (Normal) Collected: 06/18/25 0133    Lab Status: Preliminary result Specimen: Blood from Arm, Right Updated: 06/19/25 0147     Blood Culture No growth at 24 hours    Narrative:      Less than seven (7) mL's of blood was collected.  Insufficient quantity may yield false negative results.    Urine Culture - Urine, Straight Cath [[id number]]  (Abnormal)  (Susceptibility) Collected: 06/17/25 2341    Lab Status: Final result Specimen: Urine from Straight Cath Updated: 06/19/25 1053     Urine Culture >100,000 CFU/mL Escherichia coli ESBL    Narrative:      Colonization of the urinary tract without infection is common. Treatment is discouraged unless the patient is symptomatic, pregnant, or undergoing an invasive urologic procedure.  Recent outcomes data supports the use of pip/tazo in the treatment of susceptible ESBL infections for uncomplicated UTI. Consider use of pip/tazo as a carbapenem-sparing regimen in applicable patients.    Susceptibility        Escherichia coli ESBL      SHERWIN      Ciprofloxacin Resistant      Ertapenem Susceptible      Gentamicin Susceptible      Levofloxacin  Resistant      Meropenem Susceptible      Nitrofurantoin Susceptible      Piperacillin + Tazobactam Susceptible      Trimethoprim + Sulfamethoxazole Resistant                                   Laboratory  Results from last 7 days   Lab Units 06/19/25  0510   WBC 10*3/mm3 14.19*   HEMOGLOBIN g/dL 10.1*   HEMATOCRIT % 32.7*   PLATELETS 10*3/mm3 471*     Results from last 7 days   Lab Units 06/19/25  0510   SODIUM mmol/L 136   POTASSIUM mmol/L 3.7   CHLORIDE mmol/L 104   CO2 mmol/L 22.9   BUN mg/dL 12.2   CREATININE mg/dL 0.92   GLUCOSE mg/dL 125*   CALCIUM mg/dL 8.8     Results from last 7 days   Lab Units 06/19/25  0510   SODIUM mmol/L 136   POTASSIUM mmol/L 3.7   CHLORIDE mmol/L 104   CO2 mmol/L 22.9   BUN mg/dL 12.2   CREATININE mg/dL 0.92   GLUCOSE mg/dL 125*   CALCIUM mg/dL 8.8     Results from last 7 days   Lab Units 06/17/25  2331   CK TOTAL U/L 41               Radiology  Imaging Results (Last 72 Hours)       Procedure Component Value Units Date/Time    CT Angiogram Chest [[id number]] Collected: 06/18/25 0239     Updated: 06/18/25 0247    Narrative:      CT ANGIOGRAM CHEST, CT ABDOMEN PELVIS W CONTRAST    Date of Exam: 6/18/2025 1:48 AM EDT    Indication: s/p thoracic aneurysm repair, please evaluate for hemotam post op.    Comparison: None available.    Technique: CTA of the chest was performed after the uneventful intravenous administration of iodinated contrast. Reconstructed coronal and sagittal images were also obtained. In addition, a 3-D volume rendered image was created for interpretation. CT of   the abdomen and pelvis was performed after administration of intravenous contrast. Automated exposure control and iterative reconstruction methods were used.      Findings:  CT Chest:   The thyroid appears within normal limits. The trachea and the esophagus are unremarkable.  Heart size is normal.  No mediastinal or hilar lymphadenopathy.  No significant pericardial effusion. Postsurgical changes are seen  related to repair of ascending   aorta. A small amount of fluid is present along the proximal aorta with mild stranding seen within the mediastinal fat. No evidence of focal collection. No evidence of contrast extravasation. There is no evidence of dissection. No evidence of   hemodynamically significant stenosis. No evidence of pulmonary embolism. Median sternotomy wires are present. Pulmonary artery is unremarkable.    There is no pneumothorax, pleural effusion or focal airspace consolidation. There are no suspicious lung nodules.  No significant pleural disease.  Central and segmental airways appear patent.      Subcutaneous fat and underlying musculature appear within normal limits.  There are no acute osseous abnormalities or destructive bone lesions.      CT Abdomen and Pelvis:  The liver appears diffusely hypodense, likely related to steatosis.. Spleen is normal size and appears homogeneous.  Stomach is nondistended.  No adrenal mass. The right kidney appears normal in size. No evidence of hydronephrosis. Difficult to trace the   right ureter though no definite obstructing calculus identified. Vascular phleboliths are present. Stranding is seen surrounding the right kidney. Left kidney demonstrates no acute process. Simple cyst present on the left. Small fat-containing umbilical   hernia present. No evidence of bowel involvement. Atherosclerotic calcifications are present. Mesenteric vasculature appears grossly unremarkable with no nonangiographic technique utilized fracture of the abdomen and pelvis. Circumferential bladder wall   thickening is present. Stranding is seen within the adjacent fat..  There is no bowel distention.  No pneumatosis intestinalis, pneumoperitoneum or lymphadenopathy.  No significant ascites.  The appendix appears within normal limits.  Gallbladder is   nondistended.  Biliary tree is nondistended.  The pancreas appears homogeneous.  Abdominal vasculature is within normal  limits.    Subcutaneous fat and underlying musculature appear within normal limits.  There are no acute osseous abnormalities or destructive bone lesions.        Impression:      Impression:  1.Postsurgical changes related to repair of ascending aorta. A small amount of fluid is present along the proximal aorta with mild stranding seen within the mediastinal fat likely related to recent surgery. No evidence of focal collection. No evidence of   contrast extravasation. No evidence of dissection. No evidence of pulmonary embolism.  2.Circumferential bladder wall thickening with stranding seen within the adjacent fat likely related to cystitis. Stranding is seen surrounding the right kidney which may be related to pyelonephritis. No evidence of hydronephrosis. No definite   obstructing calculus identified.  3.Hepatic steatosis.  4.Ancillary findings as described above.              Electronically Signed: Melissa Torrez MD    6/18/2025 2:45 AM EDT    Workstation ID: [id number]    CT Abdomen Pelvis With Contrast [[id number]] Collected: 06/18/25 0239     Updated: 06/18/25 0247    Narrative:      CT ANGIOGRAM CHEST, CT ABDOMEN PELVIS W CONTRAST    Date of Exam: 6/18/2025 1:48 AM EDT    Indication: s/p thoracic aneurysm repair, please evaluate for hemotam post op.    Comparison: None available.    Technique: CTA of the chest was performed after the uneventful intravenous administration of iodinated contrast. Reconstructed coronal and sagittal images were also obtained. In addition, a 3-D volume rendered image was created for interpretation. CT of   the abdomen and pelvis was performed after administration of intravenous contrast. Automated exposure control and iterative reconstruction methods were used.      Findings:  CT Chest:   The thyroid appears within normal limits. The trachea and the esophagus are unremarkable.  Heart size is normal.  No mediastinal or hilar lymphadenopathy.  No significant pericardial effusion.  Postsurgical changes are seen related to repair of ascending   aorta. A small amount of fluid is present along the proximal aorta with mild stranding seen within the mediastinal fat. No evidence of focal collection. No evidence of contrast extravasation. There is no evidence of dissection. No evidence of   hemodynamically significant stenosis. No evidence of pulmonary embolism. Median sternotomy wires are present. Pulmonary artery is unremarkable.    There is no pneumothorax, pleural effusion or focal airspace consolidation. There are no suspicious lung nodules.  No significant pleural disease.  Central and segmental airways appear patent.      Subcutaneous fat and underlying musculature appear within normal limits.  There are no acute osseous abnormalities or destructive bone lesions.      CT Abdomen and Pelvis:  The liver appears diffusely hypodense, likely related to steatosis.. Spleen is normal size and appears homogeneous.  Stomach is nondistended.  No adrenal mass. The right kidney appears normal in size. No evidence of hydronephrosis. Difficult to trace the   right ureter though no definite obstructing calculus identified. Vascular phleboliths are present. Stranding is seen surrounding the right kidney. Left kidney demonstrates no acute process. Simple cyst present on the left. Small fat-containing umbilical   hernia present. No evidence of bowel involvement. Atherosclerotic calcifications are present. Mesenteric vasculature appears grossly unremarkable with no nonangiographic technique utilized fracture of the abdomen and pelvis. Circumferential bladder wall   thickening is present. Stranding is seen within the adjacent fat..  There is no bowel distention.  No pneumatosis intestinalis, pneumoperitoneum or lymphadenopathy.  No significant ascites.  The appendix appears within normal limits.  Gallbladder is   nondistended.  Biliary tree is nondistended.  The pancreas appears homogeneous.  Abdominal vasculature  is within normal limits.    Subcutaneous fat and underlying musculature appear within normal limits.  There are no acute osseous abnormalities or destructive bone lesions.        Impression:      Impression:  1.Postsurgical changes related to repair of ascending aorta. A small amount of fluid is present along the proximal aorta with mild stranding seen within the mediastinal fat likely related to recent surgery. No evidence of focal collection. No evidence of   contrast extravasation. No evidence of dissection. No evidence of pulmonary embolism.  2.Circumferential bladder wall thickening with stranding seen within the adjacent fat likely related to cystitis. Stranding is seen surrounding the right kidney which may be related to pyelonephritis. No evidence of hydronephrosis. No definite   obstructing calculus identified.  3.Hepatic steatosis.  4.Ancillary findings as described above.              Electronically Signed: Melissa Torrez MD    6/18/2025 2:45 AM EDT    Workstation ID: [id number]            Cardiology      Results Review:  I have reviewed all clinical data, test, lab, and imaging results.       Schedule Meds  amLODIPine, 2.5 mg, Oral, Daily  aspirin, 81 mg, Oral, Daily  buPROPion XL, 150 mg, Oral, Daily  clopidogrel, 75 mg, Oral, Daily  ertapenem, 1,000 mg, Intravenous, Q24H  famotidine, 40 mg, Oral, Daily  insulin lispro, 2-7 Units, Subcutaneous, 4x Daily AC & at Bedtime  metoprolol tartrate, 25 mg, Oral, BID  multivitamin, 1 tablet, Oral, Daily  rosuvastatin, 20 mg, Oral, Daily  sertraline, 50 mg, Oral, Daily  sodium chloride, 10 mL, Intravenous, Q12H        Infusion Meds       PRN Meds    acetaminophen    senna-docusate sodium **AND** polyethylene glycol **AND** bisacodyl **AND** bisacodyl    calcium carbonate    Calcium Replacement - Follow Nurse / BPA Driven Protocol    dextrose    dextrose    glucagon (human recombinant)    Magnesium Standard Dose Replacement - Follow Nurse / BPA Driven Protocol     melatonin    nitroglycerin    ondansetron    Phosphorus Replacement - Follow Nurse / BPA Driven Protocol    Potassium Replacement - Follow Nurse / BPA Driven Protocol    [COMPLETED] Insert Peripheral IV **AND** sodium chloride    sodium chloride    sodium chloride    traMADol      Assessment & Plan       Assessment    Bacteremia with ESBL  E. coli.  Most likely secondary to complicated UTI.    Complicated UTI with right sided pyelonephritis urine culture growing ESBL  E. coli.    S/p ascending aortic aneurysm repaired at [facility] on June 4, 2025.  CTA of the chest showed postsurgical changes with no focal collection.    Diabetes mellitus type 2    Plan    Discontinue IV meropenem  Start IV ertapenem 1 g daily for 2 weeks.  Last day will be July 1, 2025  The patient will need a midline before discharge and recommend to remove midline after last dose of IV ertapenem  Consult case management to arrange for home IV antibiotics  Probable discharge home tomorrow  Case was discussed with the patient and her daughter at bedside    Jasen Dykes MD  06/19/25  13:52 EDT    Note is dictated utilizing voice recognition software/Dragon

## 2025-06-19 NOTE — PLAN OF CARE
Goal Outcome Evaluation:         Pt resting this shift. 2L NC. BC positive; IV Abx changed to Merrem. Pt febrile overnight; treated with PRN tylenol. RR elevated up to 40s at times. WBC down slightly from yesterday. Hgb improved on morning labs. Pt able to make needs known. VSS. No complaints at this time.           Problem: Adult Inpatient Plan of Care  Goal: Absence of Hospital-Acquired Illness or Injury  Intervention: Identify and Manage Fall Risk  Recent Flowsheet Documentation  Taken 6/19/2025 0415 by Amanda Gillette, RN  Safety Promotion/Fall Prevention:   safety round/check completed   room organization consistent   nonskid shoes/slippers when out of bed   fall prevention program maintained   clutter free environment maintained   assistive device/personal items within reach  Taken 6/19/2025 0230 by Amanda Gillette, RN  Safety Promotion/Fall Prevention:   safety round/check completed   room organization consistent   nonskid shoes/slippers when out of bed   fall prevention program maintained   clutter free environment maintained   assistive device/personal items within reach  Taken 6/19/2025 0015 by Amanda Gillette, RN  Safety Promotion/Fall Prevention:   safety round/check completed   room organization consistent   nonskid shoes/slippers when out of bed   fall prevention program maintained   clutter free environment maintained   assistive device/personal items within reach  Taken 6/18/2025 2200 by Amanda Gillette, RN  Safety Promotion/Fall Prevention:   safety round/check completed   room organization consistent   nonskid shoes/slippers when out of bed   fall prevention program maintained   clutter free environment maintained   assistive device/personal items within reach  Taken 6/18/2025 2045 by Amanda Gillette, RN  Safety Promotion/Fall Prevention:   safety round/check completed   room organization consistent   nonskid shoes/slippers when out of bed   fall prevention program  maintained   clutter free environment maintained   assistive device/personal items within reach  Intervention: Prevent Skin Injury  Recent Flowsheet Documentation  Taken 6/19/2025 0415 by Amanda Gillette RN  Body Position:   position changed independently   weight shifting  Skin Protection: transparent dressing maintained  Taken 6/19/2025 0230 by Amanda Gillette RN  Body Position:   position changed independently   sitting up in bed  Taken 6/19/2025 0015 by Amanda Gillette RN  Body Position:   position changed independently   sitting up in bed  Skin Protection: transparent dressing maintained  Taken 6/18/2025 2200 by Amanda Gillette RN  Body Position:   position changed independently   sitting up in bed  Taken 6/18/2025 2045 by Amanda Gillette RN  Body Position:   position changed independently   sitting up in bed  Skin Protection: transparent dressing maintained  Intervention: Prevent and Manage VTE (Venous Thromboembolism) Risk  Recent Flowsheet Documentation  Taken 6/18/2025 2045 by Amanda Gillette RN  VTE Prevention/Management: (ambulatory)   bilateral   SCDs (sequential compression devices) off   patient refused intervention  Intervention: Prevent Infection  Recent Flowsheet Documentation  Taken 6/19/2025 0415 by Amanda Gillette RN  Infection Prevention:   single patient room provided   personal protective equipment utilized   hand hygiene promoted   equipment surfaces disinfected   rest/sleep promoted  Taken 6/19/2025 0230 by Amanda Gillette RN  Infection Prevention:   single patient room provided   personal protective equipment utilized   hand hygiene promoted   equipment surfaces disinfected   rest/sleep promoted  Taken 6/19/2025 0015 by Amanda Gillette RN  Infection Prevention:   single patient room provided   personal protective equipment utilized   hand hygiene promoted   equipment surfaces disinfected   rest/sleep promoted  Taken 6/18/2025 2200 by  Amanda Gillette RN  Infection Prevention:   single patient room provided   personal protective equipment utilized   hand hygiene promoted   equipment surfaces disinfected   rest/sleep promoted  Taken 6/18/2025 2045 by Amanda Gillette RN  Infection Prevention:   single patient room provided   personal protective equipment utilized   hand hygiene promoted   equipment surfaces disinfected   rest/sleep promoted  Goal: Optimal Comfort and Wellbeing  Intervention: Monitor Pain and Promote Comfort  Recent Flowsheet Documentation  Taken 6/19/2025 0415 by Amanda Gillette RN  Pain Management Interventions:   care clustered   pain management plan reviewed with patient/caregiver   position adjusted   pillow support provided   quiet environment facilitated   relaxation techniques promoted  Taken 6/19/2025 0015 by Amanda Gillette RN  Pain Management Interventions:   care clustered   pain management plan reviewed with patient/caregiver   pillow support provided   position adjusted   quiet environment facilitated   relaxation techniques promoted  Taken 6/18/2025 2045 by Amanda Gillette RN  Pain Management Interventions:   care clustered   pain management plan reviewed with patient/caregiver   pillow support provided   position adjusted   quiet environment facilitated   relaxation techniques promoted  Intervention: Provide Person-Centered Care  Recent Flowsheet Documentation  Taken 6/18/2025 2045 by Amanda Gillette RN  Trust Relationship/Rapport:   care explained   questions answered   questions encouraged

## 2025-06-19 NOTE — PROGRESS NOTES
[facility] MEDICINE SERVICE  DAILY PROGRESS NOTE    NAME: Rebekah Li  : 1950  MRN: [id number]      LOS: 1 day     PROVIDER OF SERVICE: Timothy Duane Brammell, MD    Chief Complaint: Postoperative anemia    Subjective:     Interval History:  History taken from: patient    Patient status post recent aneurysm repair due to increasing size.  She was discharged on .  She had several days of nausea vomiting as well as loose stool.  She was unaware of any fever.  Denies any urinary symptoms.  Denies any severe shortness of breath.  Does not have a home oxygen.  Denies any chest pain.  Overall feels improved today.  No further nausea or vomiting.  Denies any abdominal pain.  Denies any other additional acute issues.        Review of Systems:   Review of Systems   All other systems reviewed and are negative.      Objective:     Vital Signs  Temp:  [97.9 °F (36.6 °C)-100.5 °F (38.1 °C)] 98.5 °F (36.9 °C)  Heart Rate:  [] 93  Resp:  [15-32] 28  BP: (103-148)/(65-81) 126/66  Flow (L/min) (Oxygen Therapy):  [2] 2   Body mass index is 40.06 kg/m².    Physical Exam  Physical Exam  Vitals reviewed.   Constitutional:       Appearance: She is obese.   HENT:      Head: Normocephalic.   Cardiovascular:      Rate and Rhythm: Normal rate and regular rhythm.   Pulmonary:      Effort: Pulmonary effort is normal.      Breath sounds: Normal breath sounds.   Abdominal:      General: Bowel sounds are normal.      Palpations: Abdomen is soft.      Tenderness: There is no abdominal tenderness.   Musculoskeletal:         General: No swelling.   Skin:     Comments: Thoracic incision does not appear erythematous or any drainage.   Neurological:      General: No focal deficit present.      Mental Status: She is alert. Mental status is at baseline.   Psychiatric:         Behavior: Behavior normal.         Thought Content: Thought content normal.            Diagnostic Data    Results from last 7 days   Lab Units  06/19/25  0510   WBC 10*3/mm3 14.19*   HEMOGLOBIN g/dL 10.1*   HEMATOCRIT % 32.7*   PLATELETS 10*3/mm3 471*   GLUCOSE mg/dL 125*   CREATININE mg/dL 0.92   BUN mg/dL 12.2   SODIUM mmol/L 136   POTASSIUM mmol/L 3.7   AST (SGOT) U/L 113*   ALT (SGPT) U/L 132*   ALK PHOS U/L 275*   BILIRUBIN mg/dL 0.4   ANION GAP mmol/L 9.1       CT Angiogram Chest  Result Date: 6/18/2025  Impression: 1.Postsurgical changes related to repair of ascending aorta. A small amount of fluid is present along the proximal aorta with mild stranding seen within the mediastinal fat likely related to recent surgery. No evidence of focal collection. No evidence of  contrast extravasation. No evidence of dissection. No evidence of pulmonary embolism. 2.Circumferential bladder wall thickening with stranding seen within the adjacent fat likely related to cystitis. Stranding is seen surrounding the right kidney which may be related to pyelonephritis. No evidence of hydronephrosis. No definite obstructing calculus identified. 3.Hepatic steatosis. 4.Ancillary findings as described above. Electronically Signed: Melissa Torrez MD  6/18/2025 2:45 AM EDT  Workstation ID: [id number]    CT Abdomen Pelvis With Contrast  Result Date: 6/18/2025  Impression: 1.Postsurgical changes related to repair of ascending aorta. A small amount of fluid is present along the proximal aorta with mild stranding seen within the mediastinal fat likely related to recent surgery. No evidence of focal collection. No evidence of  contrast extravasation. No evidence of dissection. No evidence of pulmonary embolism. 2.Circumferential bladder wall thickening with stranding seen within the adjacent fat likely related to cystitis. Stranding is seen surrounding the right kidney which may be related to pyelonephritis. No evidence of hydronephrosis. No definite obstructing calculus identified. 3.Hepatic steatosis. 4.Ancillary findings as described above. Electronically Signed: Melissa Torrez MD   6/18/2025 2:45 AM EDT  Workstation ID: [id number]            Assessment:      E. coli sepsis  UTI  Nausea vomiting diarrhea  Status post recent thoracic aneurysm repair  Type 2 diabetes  COPD  Fatty liver  Transaminitis  Anemia  Iron deficiency         Plan.  Antibiotic coverage.  Infectious disease to continue.  Monitor ability to maintain oral intake.  Follow-up blood sugars.  Follow-up labs.        Active and Resolved Problems  Active Hospital Problems    Diagnosis  POA    **Postoperative anemia [D64.9]  Yes      Resolved Hospital Problems   No resolved problems to display.           VTE Prophylaxis:  Mechanical VTE prophylaxis orders are present.             Disposition Planning:     Barriers to Discharge:Infection Tx  Anticipated Date of Discharge: 6/23  Place of Discharge: home       Time: 45 minutes     Code Status and Medical Interventions: CPR (Attempt to Resuscitate); Full Support   Ordered at: 06/18/25 0124     Code Status (Patient has no pulse and is not breathing):    CPR (Attempt to Resuscitate)     Medical Interventions (Patient has pulse or is breathing):    Full Support       Signature: Electronically signed by Timothy Duane Brammell, MD, 06/19/25, 08:35 EDT.  [facility], [place], operated by [facility] Hospitalist Team

## 2025-06-20 ENCOUNTER — READMISSION MANAGEMENT (OUTPATIENT)
Dept: CALL CENTER | Facility: HOSPITAL | Age: 75
End: 2025-06-20
Payer: MEDICARE

## 2025-06-20 ENCOUNTER — TELEPHONE (OUTPATIENT)
Dept: FAMILY MEDICINE CLINIC | Facility: CLINIC | Age: 75
End: 2025-06-20

## 2025-06-20 VITALS
HEIGHT: 64 IN | TEMPERATURE: 98.1 F | WEIGHT: 232.59 LBS | OXYGEN SATURATION: 96 % | HEART RATE: 64 BPM | RESPIRATION RATE: 25 BRPM | SYSTOLIC BLOOD PRESSURE: 97 MMHG | BODY MASS INDEX: 39.71 KG/M2 | DIASTOLIC BLOOD PRESSURE: 64 MMHG

## 2025-06-20 LAB
ALBUMIN SERPL-MCNC: 2.8 G/DL (ref 3.5–5.2)
ALBUMIN/GLOB SERPL: 0.9 G/DL
ALP SERPL-CCNC: 243 U/L (ref 39–117)
ALT SERPL W P-5'-P-CCNC: 148 U/L (ref 1–33)
ANION GAP SERPL CALCULATED.3IONS-SCNC: 10.5 MMOL/L (ref 5–15)
AST SERPL-CCNC: 130 U/L (ref 1–32)
BACTERIA SPEC AEROBE CULT: ABNORMAL
BASOPHILS # BLD AUTO: 0.05 10*3/MM3 (ref 0–0.2)
BASOPHILS NFR BLD AUTO: 0.4 % (ref 0–1.5)
BILIRUB CONJ SERPL-MCNC: 0.2 MG/DL (ref 0–0.3)
BILIRUB SERPL-MCNC: 0.3 MG/DL (ref 0–1.2)
BUN SERPL-MCNC: 14 MG/DL (ref 8–23)
BUN/CREAT SERPL: 18.2 (ref 7–25)
CALCIUM SPEC-SCNC: 8.7 MG/DL (ref 8.6–10.5)
CHLORIDE SERPL-SCNC: 105 MMOL/L (ref 98–107)
CO2 SERPL-SCNC: 20.5 MMOL/L (ref 22–29)
CREAT SERPL-MCNC: 0.77 MG/DL (ref 0.57–1)
DEPRECATED RDW RBC AUTO: 52.6 FL (ref 37–54)
EGFRCR SERPLBLD CKD-EPI 2021: 82.1 ML/MIN/1.73
EOSINOPHIL # BLD AUTO: 0.37 10*3/MM3 (ref 0–0.4)
EOSINOPHIL NFR BLD AUTO: 2.8 % (ref 0.3–6.2)
ERYTHROCYTE [DISTWIDTH] IN BLOOD BY AUTOMATED COUNT: 14.2 % (ref 12.3–15.4)
GLOBULIN UR ELPH-MCNC: 3.2 GM/DL
GLUCOSE BLDC GLUCOMTR-MCNC: 111 MG/DL (ref 70–105)
GLUCOSE BLDC GLUCOMTR-MCNC: 143 MG/DL (ref 70–105)
GLUCOSE SERPL-MCNC: 103 MG/DL (ref 65–99)
GRAM STN SPEC: ABNORMAL
HCT VFR BLD AUTO: 33.5 % (ref 34–46.6)
HGB BLD-MCNC: 9.6 G/DL (ref 12–15.9)
IMM GRANULOCYTES # BLD AUTO: 0.07 10*3/MM3 (ref 0–0.05)
IMM GRANULOCYTES NFR BLD AUTO: 0.5 % (ref 0–0.5)
ISOLATED FROM: ABNORMAL
LYMPHOCYTES # BLD AUTO: 2.67 10*3/MM3 (ref 0.7–3.1)
LYMPHOCYTES NFR BLD AUTO: 19.9 % (ref 19.6–45.3)
MCH RBC QN AUTO: 28.7 PG (ref 26.6–33)
MCHC RBC AUTO-ENTMCNC: 28.7 G/DL (ref 31.5–35.7)
MCV RBC AUTO: 100.3 FL (ref 79–97)
MONOCYTES # BLD AUTO: 1.3 10*3/MM3 (ref 0.1–0.9)
MONOCYTES NFR BLD AUTO: 9.7 % (ref 5–12)
NEUTROPHILS NFR BLD AUTO: 66.7 % (ref 42.7–76)
NEUTROPHILS NFR BLD AUTO: 8.95 10*3/MM3 (ref 1.7–7)
NRBC BLD AUTO-RTO: 0 /100 WBC (ref 0–0.2)
PLATELET # BLD AUTO: 475 10*3/MM3 (ref 140–450)
PMV BLD AUTO: 9.1 FL (ref 6–12)
POTASSIUM SERPL-SCNC: 3.9 MMOL/L (ref 3.5–5.2)
PROT SERPL-MCNC: 6 G/DL (ref 6–8.5)
RBC # BLD AUTO: 3.34 10*6/MM3 (ref 3.77–5.28)
SODIUM SERPL-SCNC: 136 MMOL/L (ref 136–145)
WBC NRBC COR # BLD AUTO: 13.41 10*3/MM3 (ref 3.4–10.8)

## 2025-06-20 PROCEDURE — 36410 VNPNXR 3YR/> PHY/QHP DX/THER: CPT

## 2025-06-20 PROCEDURE — 97530 THERAPEUTIC ACTIVITIES: CPT

## 2025-06-20 PROCEDURE — 80053 COMPREHEN METABOLIC PANEL: CPT | Performed by: HOSPITALIST

## 2025-06-20 PROCEDURE — 82948 REAGENT STRIP/BLOOD GLUCOSE: CPT | Performed by: HOSPITALIST

## 2025-06-20 PROCEDURE — 25010000002 ERTAPENEM PER 500 MG: Performed by: INTERNAL MEDICINE

## 2025-06-20 PROCEDURE — 85025 COMPLETE CBC W/AUTO DIFF WBC: CPT | Performed by: HOSPITALIST

## 2025-06-20 PROCEDURE — 82248 BILIRUBIN DIRECT: CPT | Performed by: HOSPITALIST

## 2025-06-20 PROCEDURE — C1751 CATH, INF, PER/CENT/MIDLINE: HCPCS

## 2025-06-20 PROCEDURE — 97110 THERAPEUTIC EXERCISES: CPT

## 2025-06-20 RX ORDER — POTASSIUM CHLORIDE 1500 MG/1
20 TABLET, EXTENDED RELEASE ORAL DAILY
Qty: 10 TABLET | Refills: 0 | Status: SHIPPED | OUTPATIENT
Start: 2025-06-20 | End: 2025-06-30

## 2025-06-20 RX ORDER — LIDOCAINE HYDROCHLORIDE 10 MG/ML
20 INJECTION, SOLUTION INFILTRATION; PERINEURAL ONCE
Status: DISCONTINUED | OUTPATIENT
Start: 2025-06-20 | End: 2025-06-20 | Stop reason: HOSPADM

## 2025-06-20 RX ORDER — SODIUM CHLORIDE 0.9 % (FLUSH) 0.9 %
10 SYRINGE (ML) INJECTION EVERY 12 HOURS SCHEDULED
Status: DISCONTINUED | OUTPATIENT
Start: 2025-06-20 | End: 2025-06-20 | Stop reason: HOSPADM

## 2025-06-20 RX ORDER — DIPHENOXYLATE HYDROCHLORIDE AND ATROPINE SULFATE 2.5; .025 MG/1; MG/1
1 TABLET ORAL DAILY
Start: 2025-06-21

## 2025-06-20 RX ORDER — SODIUM CHLORIDE 0.9 % (FLUSH) 0.9 %
10 SYRINGE (ML) INJECTION AS NEEDED
Status: DISCONTINUED | OUTPATIENT
Start: 2025-06-20 | End: 2025-06-20 | Stop reason: HOSPADM

## 2025-06-20 RX ORDER — ACETAMINOPHEN 325 MG/1
650 TABLET ORAL EVERY 6 HOURS PRN
Start: 2025-06-20

## 2025-06-20 RX ORDER — FERROUS SULFATE 325(65) MG
325 TABLET ORAL EVERY OTHER DAY
Qty: 30 TABLET | Refills: 1 | Status: SHIPPED | OUTPATIENT
Start: 2025-06-20

## 2025-06-20 RX ORDER — SODIUM CHLORIDE 9 MG/ML
40 INJECTION, SOLUTION INTRAVENOUS AS NEEDED
Status: DISCONTINUED | OUTPATIENT
Start: 2025-06-20 | End: 2025-06-20 | Stop reason: HOSPADM

## 2025-06-20 RX ADMIN — ERTAPENEM SODIUM 1000 MG: 1 INJECTION, POWDER, LYOPHILIZED, FOR SOLUTION INTRAMUSCULAR; INTRAVENOUS at 14:26

## 2025-06-20 RX ADMIN — ASPIRIN 81 MG: 81 TABLET, COATED ORAL at 08:55

## 2025-06-20 RX ADMIN — THERA TABS 1 TABLET: TAB at 08:55

## 2025-06-20 RX ADMIN — ANTACID TABLETS 1 TABLET: 500 TABLET, CHEWABLE ORAL at 10:35

## 2025-06-20 RX ADMIN — FAMOTIDINE 40 MG: 20 TABLET, FILM COATED ORAL at 08:55

## 2025-06-20 RX ADMIN — BUPROPION HYDROCHLORIDE 150 MG: 150 TABLET, EXTENDED RELEASE ORAL at 08:55

## 2025-06-20 RX ADMIN — ROSUVASTATIN CALCIUM 20 MG: 10 TABLET, FILM COATED ORAL at 08:55

## 2025-06-20 RX ADMIN — Medication 10 ML: at 08:55

## 2025-06-20 RX ADMIN — METOPROLOL TARTRATE 25 MG: 25 TABLET, FILM COATED ORAL at 08:55

## 2025-06-20 RX ADMIN — SERTRALINE HYDROCHLORIDE 50 MG: 50 TABLET, FILM COATED ORAL at 08:55

## 2025-06-20 RX ADMIN — AMLODIPINE BESYLATE 2.5 MG: 2.5 TABLET ORAL at 08:55

## 2025-06-20 RX ADMIN — CLOPIDOGREL BISULFATE 75 MG: 75 TABLET, FILM COATED ORAL at 08:55

## 2025-06-20 NOTE — PLAN OF CARE
Goal Outcome Evaluation:   Discharge planned for today, waiting on midline placement, and discharge education. No complaints per patient, will continue poc.        Progress: no change

## 2025-06-20 NOTE — CASE MANAGEMENT/SOCIAL WORK
Continued Stay Note  [facility]     Patient Name: Rebekah Li  MRN: [id number]  Today's Date: 6/20/2025    Admit Date: 6/17/2025    Plan: Home with daughter. [facility] (accepted, need order). [facility] for IV Abx   Discharge Plan       Row Name 06/20/25 1114       Plan    Plan Home with daughter. [facility] (accepted, need order). [facility] for IV Abx    Plan Comments CM met with patient at bedside to discuss IV abx need until July 1st. Patient is agreeable and daughter lives at home with her and will learn the infusion education. Patient agreeable to [facility] and [facility] for infusion needs. CM sent inbaskets and notified [facility]. [facility] confirmed they accepted patient for home health care. Patient will need line placed later today and teaching at bedside by [facility] prior to discharge. Primary nurse updated.             Susan Batres RN      [facility]  Office: [phone]  Cell: [phone]  Fax # [phone]

## 2025-06-20 NOTE — PROGRESS NOTES
Infectious Diseases Progress Note      LOS: 2 days   Patient Care Team:  Gogo Eaton PA-C as PCP - General (Family Medicine)    Chief Complaint: Currently with no complaints    Subjective     The patient had no fever during the last 24 hours.  She remained hemodynamically stable.  Denied having any new complaints today.    Review of Systems:   Review of Systems   Constitutional: Negative.    HENT: Negative.     Eyes: Negative.    Respiratory: Negative.     Cardiovascular: Negative.    Gastrointestinal: Negative.    Genitourinary: Negative.    Musculoskeletal: Negative.    Skin: Negative.    Neurological: Negative.    Hematological: Negative.    Psychiatric/Behavioral: Negative.          Objective     Vital Signs  Temp:  [98.1 °F (36.7 °C)-99.9 °F (37.7 °C)] 98.1 °F (36.7 °C)  Heart Rate:  [80-97] 80  Resp:  [16-32] 25  BP: (119-148)/() 148/101    Physical Exam:  Physical Exam  Vitals and nursing note reviewed.   Constitutional:       Appearance: She is well-developed.   HENT:      Head: Normocephalic and atraumatic.   Eyes:      Pupils: Pupils are equal, round, and reactive to light.   Cardiovascular:      Rate and Rhythm: Normal rate and regular rhythm.      Heart sounds: Normal heart sounds.   Pulmonary:      Effort: Pulmonary effort is normal. No respiratory distress.      Breath sounds: Normal breath sounds. No wheezing or rales.   Abdominal:      General: Bowel sounds are normal. There is no distension.      Palpations: Abdomen is soft. There is no mass.      Tenderness: There is no abdominal tenderness. There is no guarding or rebound.   Musculoskeletal:         General: No deformity. Normal range of motion.      Cervical back: Normal range of motion and neck supple.   Skin:     General: Skin is warm.      Findings: No erythema or rash.   Neurological:      Mental Status: She is alert and oriented to person, place, and time.      Cranial Nerves: No cranial nerve deficit.          Results Review:     I have reviewed all clinical data, test, lab, and imaging results.     Radiology  No Radiology Exams Resulted Within Past 24 Hours    Cardiology    Laboratory    Results from last 7 days   Lab Units 06/20/25 0454 06/19/25  0510 06/18/25  0558 06/17/25 2331 06/16/25  1207   WBC 10*3/mm3 13.41* 14.19* 15.13* 16.86* 22.70*   HEMOGLOBIN g/dL 9.6* 10.1* 9.1* 9.8* 10.7*   HEMATOCRIT % 33.5* 32.7* 29.2* 29.7* 34.5   PLATELETS 10*3/mm3 475* 471* 517* 581* 666*     Results from last 7 days   Lab Units 06/20/25 0454 06/19/25 0510 06/18/25 0558 06/18/25 0133 06/17/25 2331 06/16/25  1207   SODIUM mmol/L 136 136 134* 133* 131* 132*   POTASSIUM mmol/L 3.9 3.7 3.7 4.1 4.1 4.4   CHLORIDE mmol/L 105 104 102 99 98 95*   CO2 mmol/L 20.5* 22.9 21.7* 21.4* 19.9* 21.0*   BUN mg/dL 14.0 12.2 14.1 16.2 15.7 11.0   CREATININE mg/dL 0.77 0.92 0.95 1.02* 0.95 0.91   GLUCOSE mg/dL 103* 125* 121* 125* 121* 87   ALBUMIN g/dL 2.8* 3.0*  --   --  3.3* 3.5   BILIRUBIN mg/dL 0.3 0.4  --   --  0.5 0.5   ALK PHOS U/L 243* 275*  --   --  219* 181*   AST (SGOT) U/L 130* 113*  --   --  146* 45*   ALT (SGPT) U/L 148* 132*  --   --  147* 75*   CALCIUM mg/dL 8.7 8.8 8.6 8.9 8.8 9.2     Results from last 7 days   Lab Units 06/17/25  2331   CK TOTAL U/L 41             Microbiology   Microbiology Results (last 10 days)       Procedure Component Value - Date/Time    Respiratory Panel PCR w/COVID-19(SARS-CoV-2) JAZIEL/JUANITA/ARNAUD/PAD/COR/RODRIGO In-House, NP Swab in UTM/VTM, 2 HR TAT - Swab, Nasopharynx [[id number]]  (Normal) Collected: 06/18/25 0225    Lab Status: Final result Specimen: Swab from Nasopharynx Updated: 06/18/25 0323     ADENOVIRUS, PCR Not Detected     Coronavirus 229E Not Detected     Coronavirus HKU1 Not Detected     Coronavirus NL63 Not Detected     Coronavirus OC43 Not Detected     COVID19 Not Detected     Human Metapneumovirus Not Detected     Human Rhinovirus/Enterovirus Not Detected     Influenza A PCR Not Detected     Influenza B PCR Not  Detected     Parainfluenza Virus 1 Not Detected     Parainfluenza Virus 2 Not Detected     Parainfluenza Virus 3 Not Detected     Parainfluenza Virus 4 Not Detected     RSV, PCR Not Detected     Bordetella pertussis pcr Not Detected     Bordetella parapertussis PCR Not Detected     Chlamydophila pneumoniae PCR Not Detected     Mycoplasma pneumo by PCR Not Detected    Narrative:      In the setting of a positive respiratory panel with a viral infection PLUS a negative procalcitonin without other underlying concern for bacterial infection, consider observing off antibiotics or discontinuation of antibiotics and continue supportive care. If the respiratory panel is positive for atypical bacterial infection (Bordetella pertussis, Chlamydophila pneumoniae, or Mycoplasma pneumoniae), consider antibiotic de-escalation to target atypical bacterial infection.    Blood Culture - Blood, Arm, Right [[id number]]  (Abnormal)  (Susceptibility) Collected: 06/18/25 0224    Lab Status: Final result Specimen: Blood from Arm, Right Updated: 06/20/25 0636     Blood Culture Escherichia coli ESBL     Comment:   Consider infectious disease consult.  Susceptibility results may not correlate to clinical outcomes.  For ESBL-producing infections in the blood, a carbapenem is recommended as first-line therapy for optimal clinical outcomes.        Isolated from Anaerobic Bottle     Gram Stain Anaerobic Bottle Gram negative bacilli    Susceptibility        Escherichia coli ESBL      SHERWIN      Ciprofloxacin Resistant      Ertapenem Susceptible      Levofloxacin Resistant      Meropenem Susceptible      Trimethoprim + Sulfamethoxazole Resistant                       Susceptibility Comments       Escherichia coli ESBL    With the exception of urinary-sourced infections, aminoglycosides should not be used as monotherapy.               Blood Culture ID, PCR - Blood, Arm, Right [[id number]]  (Abnormal) Collected: 06/18/25 0224    Lab Status: Final result  Specimen: Blood from Arm, Right Updated: 06/18/25 1818     BCID, PCR Escherichia coli. Identification by BCID2 PCR.     BCID, PCR 2 CTX-M (ESBL) detected. Identification by BCID2 PCR     BOTTLE TYPE Anaerobic Bottle    Blood Culture - Blood, Arm, Right [[id number]]  (Normal) Collected: 06/18/25 0133    Lab Status: Preliminary result Specimen: Blood from Arm, Right Updated: 06/20/25 0145     Blood Culture No growth at 2 days    Narrative:      Less than seven (7) mL's of blood was collected.  Insufficient quantity may yield false negative results.    Urine Culture - Urine, Straight Cath [[id number]]  (Abnormal)  (Susceptibility) Collected: 06/17/25 2341    Lab Status: Final result Specimen: Urine from Straight Cath Updated: 06/19/25 1053     Urine Culture >100,000 CFU/mL Escherichia coli ESBL    Narrative:      Colonization of the urinary tract without infection is common. Treatment is discouraged unless the patient is symptomatic, pregnant, or undergoing an invasive urologic procedure.  Recent outcomes data supports the use of pip/tazo in the treatment of susceptible ESBL infections for uncomplicated UTI. Consider use of pip/tazo as a carbapenem-sparing regimen in applicable patients.    Susceptibility        Escherichia coli ESBL      SHERWIN      Ciprofloxacin Resistant      Ertapenem Susceptible      Gentamicin Susceptible      Levofloxacin Resistant      Meropenem Susceptible      Nitrofurantoin Susceptible      Piperacillin + Tazobactam Susceptible      Trimethoprim + Sulfamethoxazole Resistant                                   Medication Review:       Schedule Meds  amLODIPine, 2.5 mg, Oral, Daily  aspirin, 81 mg, Oral, Daily  buPROPion XL, 150 mg, Oral, Daily  clopidogrel, 75 mg, Oral, Daily  ertapenem, 1,000 mg, Intravenous, Q24H  famotidine, 40 mg, Oral, Daily  insulin lispro, 2-7 Units, Subcutaneous, 4x Daily AC & at Bedtime  lidocaine, 20 mL, Intradermal, Once  metoprolol tartrate, 25 mg, Oral,  BID  multivitamin, 1 tablet, Oral, Daily  rosuvastatin, 20 mg, Oral, Daily  sertraline, 50 mg, Oral, Daily  sodium chloride, 10 mL, Intravenous, Q12H        Infusion Meds       PRN Meds    acetaminophen    senna-docusate sodium **AND** polyethylene glycol **AND** bisacodyl **AND** bisacodyl    calcium carbonate    Calcium Replacement - Follow Nurse / BPA Driven Protocol    dextrose    dextrose    glucagon (human recombinant)    Magnesium Standard Dose Replacement - Follow Nurse / BPA Driven Protocol    melatonin    nitroglycerin    ondansetron    Phosphorus Replacement - Follow Nurse / BPA Driven Protocol    Potassium Replacement - Follow Nurse / BPA Driven Protocol    [COMPLETED] Insert Peripheral IV **AND** sodium chloride    sodium chloride    sodium chloride    traMADol        Assessment & Plan       Antimicrobial Therapy   1.  IV ertapenem        2.        3.        4.        5.                Assessment     Bacteremia with ESBL  E. coli.  Most likely secondary to complicated UTI.     Complicated UTI with right sided pyelonephritis urine culture growing ESBL  E. coli.     S/p ascending aortic aneurysm repaired at [facility] on June 4, 2025.  CTA of the chest showed postsurgical changes with no focal collection.     Diabetes mellitus type 2     Plan     Continue IV ertapenem 1 g daily for 2 weeks.  Last day will be July 1, 2025  The patient will need a midline before discharge and recommend to remove midline after last dose of IV ertapenem  Consult case management to arrange for home IV antibiotics  Okay to discharge home at any time        Jasen Dykes MD  06/20/25  13:02 EDT    Note is dictated utilizing voice recognition software/Dragon

## 2025-06-20 NOTE — DISCHARGE PLACEMENT REQUEST
"Rebekah Tavarez \"Rosario\" (74 y.o. Female)       Date of Birth   1950    Social Security Number       [facility] [place] [facility]    Home Phone   [phone]    MRN   [id number]       Mandaen   [employer]    Marital Status                               Admission Date   6/17/2025    Admission Type   Emergency    Admitting Provider       Attending Provider   Brammell, Timothy Duane, MD    Department, Room/Bed   [facility], 2102/1       Discharge Date       Discharge Disposition       Discharge Destination                                 Attending Provider: Brammell, Timothy Duane, MD    Allergies: No Known Allergies    Isolation: Contact   Infection: ESBL (06/18/25), ESBL E coli (06/19/25)   Code Status: CPR    Ht: 163.2 cm (64.25\")   Wt: 105 kg (232 lb 9.4 oz)    Admission Cmt: None   Principal Problem: Postoperative anemia [D64.9]                   Active Insurance as of 6/17/2025       Primary Coverage       Payor Plan Insurance Group Employer/Plan Group    ANTH MEDICARE REPLACEMENT ANTH MEDICARE ADVANTAGE HMO [id number]       Payor Plan Address Payor Plan Phone Number Payor Plan Fax Number Effective Dates    [address] [phone]  1/1/2025 - None Entered    [facility] [address]         Subscriber Name Subscriber Birth Date Member ID       REBEKAH TAVAREZ 1950 [id number]                     Emergency Contacts        (Rel.) Home Phone Work Phone Mobile Phone    BREANNE TAVAREZ (Daughter) -- -- [phone]            "

## 2025-06-20 NOTE — TELEPHONE ENCOUNTER
Please be advised. Crio called and confirmed that she will be going home with a pic-line from the hospital. Ciro will be in charge of medications and the IV antibiotics. They plan on going to the patients house on 6/21/2025. I gave a verbal confirmation that this was ok.

## 2025-06-20 NOTE — THERAPY TREATMENT NOTE
"Subjective: Pt agreeable to therapeutic plan of care. Patient didn't want to walk since its been a few days and she just wanted to get into chair first.    Objective:     Precautions - falls, contact    Bed mobility - Independent  Transfers - SBA with UE support and extra time  Ambulation - feet N/A or Not attempted.    Therapeutic Exercise - 10 Reps B LE AROM supported sitting / chair    Vitals: WNL    Pain: 0 VAS     Education: Provided education on the importance of mobility in the acute care setting, Verbal/Tactile Cues, and Transfer Training    Assessment: Rebekah Li presents with functional mobility impairments which indicate the need for skilled intervention. Tolerating session today without incident. Pt did well with bed mobility and transfer. Plans on home with daughter and HH at [place].Will continue to follow and progress as tolerated.     Plan/Recommendations:   If medically appropriate, Low Intensity Therapy recommended post-acute care - This is recommended as therapy feels this patient would require 2-3 visits per week. OP or HH would be the best option depending on patient's home bound status. Consider, if the patient has other  \"skilled\" needs such as wounds, IV antibiotics, etc. Combined with \"low intensity\" could also equate to a SNF. If patient is medically complex, consider LTAC. Pt requires no DME at discharge.     Pt desires Home with family assist and Home Health at discharge. Pt cooperative; agreeable to therapeutic recommendations and plan of care.         Basic Mobility 6-click:  Rollin = Total, A lot = 2, A little = 3; 4 = None  Supine>Sit:   1 = Total, A lot = 2, A little = 3; 4 = None   Sit>Stand with arms:  1 = Total, A lot = 2, A little = 3; 4 = None  Bed>Chair:   1 = Total, A lot = 2, A little = 3; 4 = None  Ambulate in room:  1 = Total, A lot = 2, A little = 3; 4 = None  3-5 Steps with railin = Total, A lot = 2, A little = 3; 4 = None  Score: 22    Post-Tx Position: " Up in Chair, Alarms activated, and Call light and personal items within reach  PPE: gloves and gown    Therapy Charges for Today       Code Description Service Date Service Provider Modifiers Qty    79281612506  PT THERAPEUTIC ACT EA 15 MIN 6/20/2025 Shahla Davis PTA GP 1    16359438680 HC PT THER PROC EA 15 MIN 6/20/2025 Shahla Davis PTA GP 1           PT Charges       Row Name 06/20/25 1123             Time Calculation    Start Time 0856  -      Stop Time 0915  -      Time Calculation (min) 19 min  -      PT Received On 06/20/25  -      PT - Next Appointment 06/23/25  -         Time Calculation- PT    Total Timed Code Minutes- PT 19 minute(s)  -                User Key  (r) = Recorded By, (t) = Taken By, (c) = Cosigned By      Initials Name Provider Type     Shahla Davis PTA Physical Therapist Assistant

## 2025-06-20 NOTE — DISCHARGE SUMMARY
[facility] Medicine Services  Discharge Summary    Date of Service: 2025  Patient Name: Rebekah Li  : 1950  MRN: [id number]    Date of Admission: 2025  Discharge Diagnosis:    E. coli sepsis  UTI  Nausea vomiting diarrhea  Status post recent thoracic aneurysm repair  Type 2 diabetes  COPD  Fatty liver  Transaminitis  Anemia  Iron deficiency      Date of Discharge: 2025  Primary Care Physician: Gogo Eaton PA-C      Presenting Problem:   Postoperative anemia [D64.9]  Acute cystitis without hematuria [N30.00]  Nausea and vomiting, unspecified vomiting type [R11.2]    Active and Resolved Hospital Problems:  Active Hospital Problems    Diagnosis POA    **Postoperative anemia [D64.9] Yes      Resolved Hospital Problems   No resolved problems to display.         Hospital Course     HPI:    Initially presented with nausea vomiting and fever as discussed in history and physical of the 2025.    Hospital Course:  This is a pleasant 74-year-old white female recently status post thoracic aneurysm repair.  She presented with issues as discussed in history and physical.  Show elevated temperature in excess of 102.  Oxygenation was maintained on room air.  Blood pressures were controlled.  She had growth of urinary organism of E. coli as well as positive blood cultures for E. coli both with noted resistance pattern.  Upper respiratory viral panel was negative.  CT angiogram of the chest failed to show any evidence of pulmonary emboli.  Some noted postsurgical changes of the aorta with small amount of fluid present not felt to represent an abscess.  She had some noted changes consistent with cystitis of her urinary bladder.  She had noted fatty liver changes.  CBC showed a moderate anemia as well as significant leukocytosis.  Blood chemistries were unremarkable other than mild transaminase elevation as well as mild decreased sodium level.  Thyroid functions appeared  normal.  Urinalysis had its evidence of infection.  Hepatitis studies were negative.  Follow-up CBC showed improvement in her leukocytosis.  She was noted to be iron deficient with an iron saturation of 6%.  She was seen in evaluation by infectious disease.  It was thought that she should have a course of IV antibiotics in the form of ertapenem 1 g daily for 2 weeks with last day being July 1, 2025.  She had peripheral IV catheter placed for administration of home IV antibiotics.  She was discharged home to follow-up with primary care as well as prior surgical evaluator.                Day of Discharge     Vital Signs:  Temp:  [98.1 °F (36.7 °C)-99.9 °F (37.7 °C)] 98.1 °F (36.7 °C)  Heart Rate:  [64-97] 64  Resp:  [16-32] 25  BP: ()/() 97/64  Flow (L/min) (Oxygen Therapy):  [2] 2    Physical Exam:  Physical Exam  Vitals reviewed.   Constitutional:       General: She is not in acute distress.     Appearance: Normal appearance. She is obese.   HENT:      Head: Normocephalic.   Cardiovascular:      Rate and Rhythm: Normal rate and regular rhythm.   Pulmonary:      Effort: Pulmonary effort is normal.      Breath sounds: Normal breath sounds.   Abdominal:      General: Bowel sounds are normal.      Palpations: Abdomen is soft.      Tenderness: There is no abdominal tenderness.   Musculoskeletal:         General: No swelling.   Skin:     Comments: Chest incision without any evidence of erythema or drainage.   Neurological:      Mental Status: She is alert. Mental status is at baseline.   Psychiatric:         Behavior: Behavior normal.         Thought Content: Thought content normal.            Pertinent  and/or Most Recent Results     LAB RESULTS:      Lab 06/20/25  0454 06/19/25  0510 06/18/25  0558 06/18/25  0224 06/17/25  2331 06/16/25  1207   WBC 13.41* 14.19* 15.13*  --  16.86* 22.70*   HEMOGLOBIN 9.6* 10.1* 9.1*  --  9.8* 10.7*   HEMATOCRIT 33.5* 32.7* 29.2*  --  29.7* 34.5   PLATELETS 475* 471* 517*  --   581* 666*   NEUTROS ABS 8.95* 10.35* 10.59*  --  12.62* 17.75*   IMMATURE GRANS (ABS) 0.07* 0.07* 0.09*  --  0.09* 0.18*   LYMPHS ABS 2.67 2.41 2.75  --  2.26 2.77   MONOS ABS 1.30* 1.17* 1.49*  --  1.74* 1.88*   EOS ABS 0.37 0.15 0.16  --  0.10 0.07   .3* 92.6 92.1  --  89.5 94.3   PROCALCITONIN  --   --   --   --  0.35*  --    LACTATE  --   --   --  1.4  --   --          Lab 06/20/25  0454 06/19/25  0510 06/18/25  0558 06/18/25  0133 06/17/25  2331   SODIUM 136 136 134* 133* 131*   POTASSIUM 3.9 3.7 3.7 4.1 4.1   CHLORIDE 105 104 102 99 98   CO2 20.5* 22.9 21.7* 21.4* 19.9*   ANION GAP 10.5 9.1 10.3 12.6 13.1   BUN 14.0 12.2 14.1 16.2 15.7   CREATININE 0.77 0.92 0.95 1.02* 0.95   EGFR 82.1 66.3 63.8 58.6* 63.8   GLUCOSE 103* 125* 121* 125* 121*   CALCIUM 8.7 8.8 8.6 8.9 8.8   MAGNESIUM  --  2.1 2.1 1.9  --    TSH  --   --   --   --  1.260         Lab 06/20/25  0454 06/19/25  0510 06/17/25  2331 06/16/25  1207   TOTAL PROTEIN 6.0 6.2 6.5 7.2   ALBUMIN 2.8* 3.0* 3.3* 3.5   GLOBULIN 3.2  --  3.2 3.7   ALT (SGPT) 148* 132* 147* 75*   AST (SGOT) 130* 113* 146* 45*   BILIRUBIN 0.3 0.4 0.5 0.5   INDIRECT BILIRUBIN  --  0.2  --   --    BILIRUBIN DIRECT 0.2 0.2  --   --    ALK PHOS 243* 275* 219* 181*                 Lab 06/18/25  0558 06/18/25  0133 06/17/25  2331   IRON 14*  --   --    IRON SATURATION (TSAT) 6*  --   --    TIBC 234*  --   --    TRANSFERRIN 157*  --   --    FERRITIN  --   --  468.00*   FOLATE  --  10.20  --    VITAMIN B 12  --  596  --          Brief Urine Lab Results  (Last result in the past 365 days)        Color   Clarity   Blood   Leuk Est   Nitrite   Protein   CREAT   Urine HCG        06/17/25 2341 Orange  Comment: Any Substance that causes an abnormal urine color can alter the accuracy of the chemical reactions.   Turbid   Large (3+)   Moderate (2+)   Positive   >=300 mg/dL (3+)                 Microbiology Results (last 10 days)       Procedure Component Value - Date/Time    Respiratory  Panel PCR w/COVID-19(SARS-CoV-2) JAZIEL/JUANITA/ARNAUD/PAD/COR/RODRIGO In-House, NP Swab in UTM/VTM, 2 HR TAT - Swab, Nasopharynx [[id number]]  (Normal) Collected: 06/18/25 0225    Lab Status: Final result Specimen: Swab from Nasopharynx Updated: 06/18/25 0323     ADENOVIRUS, PCR Not Detected     Coronavirus 229E Not Detected     Coronavirus HKU1 Not Detected     Coronavirus NL63 Not Detected     Coronavirus OC43 Not Detected     COVID19 Not Detected     Human Metapneumovirus Not Detected     Human Rhinovirus/Enterovirus Not Detected     Influenza A PCR Not Detected     Influenza B PCR Not Detected     Parainfluenza Virus 1 Not Detected     Parainfluenza Virus 2 Not Detected     Parainfluenza Virus 3 Not Detected     Parainfluenza Virus 4 Not Detected     RSV, PCR Not Detected     Bordetella pertussis pcr Not Detected     Bordetella parapertussis PCR Not Detected     Chlamydophila pneumoniae PCR Not Detected     Mycoplasma pneumo by PCR Not Detected    Narrative:      In the setting of a positive respiratory panel with a viral infection PLUS a negative procalcitonin without other underlying concern for bacterial infection, consider observing off antibiotics or discontinuation of antibiotics and continue supportive care. If the respiratory panel is positive for atypical bacterial infection (Bordetella pertussis, Chlamydophila pneumoniae, or Mycoplasma pneumoniae), consider antibiotic de-escalation to target atypical bacterial infection.    Blood Culture - Blood, Arm, Right [[id number]]  (Abnormal)  (Susceptibility) Collected: 06/18/25 0224    Lab Status: Final result Specimen: Blood from Arm, Right Updated: 06/20/25 0636     Blood Culture Escherichia coli ESBL     Comment:   Consider infectious disease consult.  Susceptibility results may not correlate to clinical outcomes.  For ESBL-producing infections in the blood, a carbapenem is recommended as first-line therapy for optimal clinical outcomes.        Isolated from Anaerobic  Bottle     Gram Stain Anaerobic Bottle Gram negative bacilli    Susceptibility        Escherichia coli ESBL      SHERWIN      Ciprofloxacin Resistant      Ertapenem Susceptible      Levofloxacin Resistant      Meropenem Susceptible      Trimethoprim + Sulfamethoxazole Resistant                       Susceptibility Comments       Escherichia coli ESBL    With the exception of urinary-sourced infections, aminoglycosides should not be used as monotherapy.               Blood Culture ID, PCR - Blood, Arm, Right [[id number]]  (Abnormal) Collected: 06/18/25 0224    Lab Status: Final result Specimen: Blood from Arm, Right Updated: 06/18/25 1818     BCID, PCR Escherichia coli. Identification by BCID2 PCR.     BCID, PCR 2 CTX-M (ESBL) detected. Identification by BCID2 PCR     BOTTLE TYPE Anaerobic Bottle    Blood Culture - Blood, Arm, Right [[id number]]  (Normal) Collected: 06/18/25 0133    Lab Status: Preliminary result Specimen: Blood from Arm, Right Updated: 06/20/25 0145     Blood Culture No growth at 2 days    Narrative:      Less than seven (7) mL's of blood was collected.  Insufficient quantity may yield false negative results.    Urine Culture - Urine, Straight Cath [[id number]]  (Abnormal)  (Susceptibility) Collected: 06/17/25 2341    Lab Status: Final result Specimen: Urine from Straight Cath Updated: 06/19/25 1053     Urine Culture >100,000 CFU/mL Escherichia coli ESBL    Narrative:      Colonization of the urinary tract without infection is common. Treatment is discouraged unless the patient is symptomatic, pregnant, or undergoing an invasive urologic procedure.  Recent outcomes data supports the use of pip/tazo in the treatment of susceptible ESBL infections for uncomplicated UTI. Consider use of pip/tazo as a carbapenem-sparing regimen in applicable patients.    Susceptibility        Escherichia coli ESBL      SHERWIN      Ciprofloxacin Resistant      Ertapenem Susceptible      Gentamicin Susceptible      Levofloxacin  Resistant      Meropenem Susceptible      Nitrofurantoin Susceptible      Piperacillin + Tazobactam Susceptible      Trimethoprim + Sulfamethoxazole Resistant                                   CT Angiogram Chest  Result Date: 6/18/2025  Impression: Impression: 1.Postsurgical changes related to repair of ascending aorta. A small amount of fluid is present along the proximal aorta with mild stranding seen within the mediastinal fat likely related to recent surgery. No evidence of focal collection. No evidence of  contrast extravasation. No evidence of dissection. No evidence of pulmonary embolism. 2.Circumferential bladder wall thickening with stranding seen within the adjacent fat likely related to cystitis. Stranding is seen surrounding the right kidney which may be related to pyelonephritis. No evidence of hydronephrosis. No definite obstructing calculus identified. 3.Hepatic steatosis. 4.Ancillary findings as described above. Electronically Signed: Melissa Torrez MD  6/18/2025 2:45 AM EDT  Workstation ID: [id number]    CT Abdomen Pelvis With Contrast  Result Date: 6/18/2025  Impression: Impression: 1.Postsurgical changes related to repair of ascending aorta. A small amount of fluid is present along the proximal aorta with mild stranding seen within the mediastinal fat likely related to recent surgery. No evidence of focal collection. No evidence of  contrast extravasation. No evidence of dissection. No evidence of pulmonary embolism. 2.Circumferential bladder wall thickening with stranding seen within the adjacent fat likely related to cystitis. Stranding is seen surrounding the right kidney which may be related to pyelonephritis. No evidence of hydronephrosis. No definite obstructing calculus identified. 3.Hepatic steatosis. 4.Ancillary findings as described above. Electronically Signed: Melissa Torrez MD  6/18/2025 2:45 AM EDT  Workstation ID: [id number]                  Labs Pending at Discharge:  Pending Results        None            Procedures Performed           Consults:   Consults       Date and Time Order Name Status Description    6/19/2025  7:48 AM Inpatient Infectious Diseases Consult Completed     6/18/2025 12:43 AM Hospitalist (on-call MD unless specified)                Discharge Details        Discharge Medications        New Medications        Instructions Start Date   acetaminophen 325 MG tablet  Commonly known as: TYLENOL   650 mg, Oral, Every 6 Hours PRN      ertapenem 1,000 mg in sodium chloride 0.9 % 100 mL IVPB   1,000 mg, Intravenous, Every 24 Hours   Start Date: June 21, 2025     ferrous sulfate 325 (65 FE) MG tablet   325 mg, Oral, Every Other Day      multivitamin tablet tablet   1 tablet, Oral, Daily   Start Date: June 21, 2025            Continue These Medications        Instructions Start Date   albuterol sulfate  (90 Base) MCG/ACT inhaler  Commonly known as: PROVENTIL HFA;VENTOLIN HFA;PROAIR HFA   2 puffs, Every 4 Hours PRN      albuterol 1.25 MG/3ML nebulizer solution  Commonly known as: ACCUNEB   1 ampule, Every 4 Hours PRN      amLODIPine 2.5 MG tablet  Commonly known as: NORVASC   2.5 mg, Daily      ammonium lactate 12 % cream  Commonly known as: AMLACTIN   12 doses      aspirin 81 MG EC tablet   81 mg, Daily      buPROPion  MG 24 hr tablet  Commonly known as: WELLBUTRIN XL   150 mg, Oral, Daily      clopidogrel 75 MG tablet  Commonly known as: PLAVIX   75 mg, Daily      famotidine 40 MG tablet  Commonly known as: PEPCID   40 mg, Daily      loratadine 10 MG tablet  Commonly known as: CLARITIN   10 mg, Oral, Daily      metFORMIN 500 MG tablet  Commonly known as: GLUCOPHAGE   500 mg, Oral, 2 Times Daily With Meals      metoprolol tartrate 25 MG tablet  Commonly known as: LOPRESSOR   25 mg, 2 Times Daily      ondansetron ODT 4 MG disintegrating tablet  Commonly known as: ZOFRAN-ODT   4 mg, Translingual, Every 12 Hours PRN      pantoprazole 40 MG EC tablet  Commonly known as: PROTONIX   40  mg, Daily      potassium chloride 20 MEQ CR tablet  Commonly known as: KLOR-CON M20   20 mEq, Oral, Daily      rosuvastatin 20 MG tablet  Commonly known as: CRESTOR   20 mg, Daily      sertraline 50 MG tablet  Commonly known as: ZOLOFT   50 mg, Daily      traMADol 50 MG tablet  Commonly known as: ULTRAM   50 mg, Oral, Every 6 Hours PRN      varenicline 1 MG tablet  Commonly known as: CHANTIX   1 mg, Oral, 2 Times Daily               No Known Allergies      Discharge Disposition: home  Home or Self Care    Diet:  Hospital:  Diet Order   Procedures    Diet: Diabetic; Consistent Carbohydrate; Fluid Consistency: Thin (IDDSI 0)         Discharge Activity:         CODE STATUS:  Code Status and Medical Interventions: CPR (Attempt to Resuscitate); Full Support   Ordered at: 06/18/25 0124     Code Status (Patient has no pulse and is not breathing):    CPR (Attempt to Resuscitate)     Medical Interventions (Patient has pulse or is breathing):    Full Support         Future Appointments   Date Time Provider Department Center   6/23/2025 11:00 AM [facility]   12/5/2025  8:15 AM Gogo Eaton PA-C [facility]       Additional Instructions for the Follow-ups that You Need to Schedule       Discharge Follow-up with PCP   As directed       Currently Documented PCP:    Gogo Eaton PA-C    PCP Phone Number:    [phone]     Follow Up Details: one week                Time spent on Discharge including face to face service:  45 minutes    Signature: Electronically signed by Timothy Duane Brammell, MD, 06/20/25, 15:35 EDT.  [facility] Hospitalist Team

## 2025-06-20 NOTE — PLAN OF CARE
Assessment: Rebekah Li presents with functional mobility impairments which indicate the need for skilled intervention. Tolerating session today without incident. Pt did well with bed mobility and transfer. Plans on home with daughter and HH at [place].Will continue to follow and progress as tolerated.

## 2025-06-20 NOTE — OUTREACH NOTE
Prep Survey      Flowsheet Row Responses   [facility] patient discharged from? [place]   Is LACE score < 7 ? Yes   Eligibility [facility]   Date of Admission 06/17/25   Date of Discharge 06/20/25   Discharge Disposition Home-Health Care Sv   Discharge diagnosis Postoperative anemia, E. coli sepsis   Does the patient have one of the following disease processes/diagnoses(primary or secondary)? Sepsis   Does the patient have Home health ordered? Yes   What is the Home health agency?  [facility]   Is there a DME ordered? Yes   What DME was ordered? [facility] for IV Abx   Medication alerts for this patient [facility] for IV Abx   Prep survey completed? Yes            Sara BURKS - Registered Nurse

## 2025-06-20 NOTE — TELEPHONE ENCOUNTER
Caller: STEPHEN [facility]    Relationship:     Best call back number: [phone]    What is the best time to reach you: ANYTIME    Who are you requesting to speak with (clinical staff, provider,  specific staff member): CLINICAL     What was the call regarding: STEPHEN IS CALLING TO LET BOYD KNOW THAT PATIENT IS DISCHARGING FROM [facility] AND IS GOING HOME ON IV ANTIBIOTICS THAT IS MANAGED BY INFECTIOUS DISEASE. WANTING BOYD TO ADDRESS THE PATIENT ON MAKING SURE SHE IS COMFORTABLE WITH THESE MEDIATIONS      Is it okay if the provider responds through MyChart: NO

## 2025-06-20 NOTE — CONSULTS
Midline Line Insertion Procedure Note    Procedure: Insertion of #18G/10CM PowerGlide    Indications:  Long Term IV therapy    Procedure Details:   Sterile technique was used including antiseptics, cap, gloves, gown, hand hygiene, mask, and sheet.    #18G/10CM PowerGlide inserted to the L Cephalic vein per hospital protocol by BOWEN sterling.     Non-pulsatile blood return: yes    Ultrasound Guidance: Yes    Lot #: [id number]  Expiration date: 02282026    Complications:  none    Findings:  Catheter inserted to 10 cm, with 0 cm exposed.   Mid upper arm circumference is 32 cm.  Catheter was flushed with 20 cc NS and sterile dressing applied.   Patient tolerated procedure well.    Recommendations:  Verbal and/or written Care/Maintenance instructions provided to patient.   Primary nurse notified that midline is okay to use at this time.     Osei Sterling RN  06/20/25  15:55 EDT

## 2025-06-23 ENCOUNTER — OFFICE VISIT (OUTPATIENT)
Dept: FAMILY MEDICINE CLINIC | Facility: CLINIC | Age: 75
End: 2025-06-23
Payer: MEDICARE

## 2025-06-23 ENCOUNTER — TRANSITIONAL CARE MANAGEMENT TELEPHONE ENCOUNTER (OUTPATIENT)
Dept: CALL CENTER | Facility: HOSPITAL | Age: 75
End: 2025-06-23
Payer: MEDICARE

## 2025-06-23 VITALS
DIASTOLIC BLOOD PRESSURE: 73 MMHG | HEART RATE: 71 BPM | TEMPERATURE: 98 F | RESPIRATION RATE: 18 BRPM | BODY MASS INDEX: 39.95 KG/M2 | HEIGHT: 64 IN | WEIGHT: 234 LBS | OXYGEN SATURATION: 97 % | SYSTOLIC BLOOD PRESSURE: 113 MMHG

## 2025-06-23 DIAGNOSIS — R74.8 ELEVATED LIVER ENZYMES: ICD-10-CM

## 2025-06-23 DIAGNOSIS — Z98.890 S/P THORACIC AORTIC ANEURYSM REPAIR: ICD-10-CM

## 2025-06-23 DIAGNOSIS — D64.9 POSTOPERATIVE ANEMIA: ICD-10-CM

## 2025-06-23 DIAGNOSIS — E11.9 TYPE 2 DIABETES MELLITUS WITHOUT COMPLICATION, WITHOUT LONG-TERM CURRENT USE OF INSULIN: ICD-10-CM

## 2025-06-23 DIAGNOSIS — Z86.79 S/P THORACIC AORTIC ANEURYSM REPAIR: ICD-10-CM

## 2025-06-23 DIAGNOSIS — A41.51 SEPSIS DUE TO ESCHERICHIA COLI, UNSPECIFIED WHETHER ACUTE ORGAN DYSFUNCTION PRESENT: ICD-10-CM

## 2025-06-23 DIAGNOSIS — Z09 HOSPITAL DISCHARGE FOLLOW-UP: Primary | ICD-10-CM

## 2025-06-23 DIAGNOSIS — N39.0 URINARY TRACT INFECTION WITHOUT HEMATURIA, SITE UNSPECIFIED: ICD-10-CM

## 2025-06-23 LAB — BACTERIA SPEC AEROBE CULT: NORMAL

## 2025-06-23 RX ORDER — ERTAPENEM 1 G/1
INJECTION, POWDER, LYOPHILIZED, FOR SOLUTION INTRAMUSCULAR; INTRAVENOUS
COMMUNITY
Start: 2025-06-20 | End: 2025-06-24 | Stop reason: SDUPTHER

## 2025-06-23 NOTE — PROGRESS NOTES
Chief Complaint  Hospital Follow Up Visit (Kidney infection. Pt states she is feeling 100% better. Paperwork for IV antibiotics?)    Subjective    History of Present Illness {CC  Problem List  Visit  Diagnosis   Encounters  Notes  Medications  Labs  Result Review Imaging  Media :23}     Rebekah Li presents to [facility] for Hospital Follow Up Visit (Kidney infection. Pt states she is feeling 100% better. Paperwork for IV antibiotics?).      History of Present Illness     History of Present Illness  The patient presents for a hospital discharge follow-up.    They report an improvement in their condition, although they continue to experience some pallor, which they attribute to missing their morning nap. They were hospitalized at [facility] from 06/17/2025 to 06/20/2025 for three days with diagnoses including E. coli, sepsis, urinary tract infection (UTI), nausea, vomiting, diarrhea, status post recent thoracic aneurysm repair, type 2 diabetes, and chronic obstructive pulmonary disease (COPD). They report despite having had a severe bladder infection, they were without any typical UTI symptoms such as burning sensation, frequent urination, or pain.    They were discharged with a PICC line in place and instruction for IV antibiotics per ID. Home health was arranged with [facility] which I already authorized prior to this visit. H    Currently, they are on a regimen of ertapenem 1 g daily x 11 doses todal, administered via a PICC line by their daughter, which is scheduled to continue until 07/01/2025.     They have been prescribed iron supplements to be taken every other day to address anemia, which is expected to improve post-surgery.    They were not informed about any elevation in their liver enzymes. They use Tylenol as needed for pain management, but not on a daily basis.    They continue to feel weak but much improved from hospitalization.     PAST SURGICAL  "HISTORY:  Thoracic aneurysm repair        Objective     Vital Signs:   /73   Pulse 71   Temp 98 °F (36.7 °C) (Temporal)   Resp 18   Ht 163.2 cm (64.25\")   Wt 106 kg (234 lb)   SpO2 97%   BMI 39.85 kg/m²   Current Outpatient Medications on File Prior to Visit   Medication Sig Dispense Refill    acetaminophen (TYLENOL) 325 MG tablet Take 2 tablets by mouth Every 6 (Six) Hours As Needed for Mild Pain, Headache or Fever.      albuterol (ACCUNEB) 1.25 MG/3ML nebulizer solution Take 3 mL by nebulization Every 4 (Four) Hours As Needed for Wheezing.      albuterol sulfate  (90 Base) MCG/ACT inhaler Inhale 2 puffs Every 4 (Four) Hours As Needed for Shortness of Air.      amLODIPine (NORVASC) 2.5 MG tablet Take 1 tablet by mouth Daily.      ammonium lactate (AMLACTIN) 12 % cream Apply 12 doses topically to the appropriate area as directed.      aspirin 81 MG EC tablet Take 1 tablet by mouth Daily.      buPROPion XL (WELLBUTRIN XL) 150 MG 24 hr tablet Take 1 tablet by mouth Daily. 30 tablet 2    clopidogrel (PLAVIX) 75 MG tablet Take 1 tablet by mouth Daily.      ertapenem 1,000 mg in sodium chloride 0.9 % 100 mL IVPB Infuse 1,000 mg into a venous catheter Daily for 11 doses. Indications: Bacteria in the Blood, Pyelonephritis      famotidine (PEPCID) 40 MG tablet Take 1 tablet by mouth Daily.      ferrous sulfate 325 (65 FE) MG tablet Take 1 tablet by mouth Every Other Day. 30 tablet 1    loratadine (CLARITIN) 10 MG tablet Take 1 tablet by mouth Daily. 90 tablet 0    metoprolol tartrate (LOPRESSOR) 25 MG tablet Take 1 tablet by mouth 2 (Two) Times a Day.      multivitamin (Thera) tablet tablet Take 1 tablet by mouth Daily.      ondansetron ODT (ZOFRAN-ODT) 4 MG disintegrating tablet Place 1 tablet on the tongue Every 12 (Twelve) Hours As Needed for Nausea or Vomiting. 20 tablet 0    pantoprazole (PROTONIX) 40 MG EC tablet Take 1 tablet by mouth Daily.      potassium chloride (KLOR-CON M20) 20 MEQ CR tablet " Take 1 tablet by mouth Daily for 10 days. 10 tablet 0    rosuvastatin (CRESTOR) 20 MG tablet Take 1 tablet by mouth Daily.      sertraline (ZOLOFT) 50 MG tablet Take 1 tablet by mouth Daily.      traMADol (ULTRAM) 50 MG tablet Take 1 tablet by mouth Every 6 (Six) Hours As Needed for Moderate Pain.      varenicline (CHANTIX) 1 MG tablet Take 1 tablet by mouth 2 (Two) Times a Day. 56 tablet 0    [DISCONTINUED] ertapenem (INVanz) 1 g injection       [DISCONTINUED] metFORMIN (GLUCOPHAGE) 500 MG tablet Take 1 tablet by mouth 2 (Two) Times a Day With Meals. 180 tablet 0     No current facility-administered medications on file prior to visit.        Past Medical History:   Diagnosis Date    Anxiety     Cataract     COPD (chronic obstructive pulmonary disease)     Depression     Diabetes mellitus     GERD (gastroesophageal reflux disease)     Hyperlipidemia     Hypertension     Obesity     Peptic ulceration       Past Surgical History:   Procedure Laterality Date    APPENDECTOMY      CARDIAC CATHETERIZATION      CARDIAC SURGERY      Aneurysm repair    COLONOSCOPY      HYSTERECTOMY      TUBAL ABDOMINAL LIGATION        Family History   Problem Relation Age of Onset    Hypertension Mother             Arthritis Mother     Hypertension Father     Stroke Father     Heart disease Father             Hypertension Brother     Heart disease Brother     Diabetes Brother             Anxiety disorder Brother     Depression Daughter     Diabetes Daughter     Anxiety disorder Daughter     Hypertension Daughter     Depression Son             Diabetes Brother             Hypertension Brother       Social History     Socioeconomic History    Marital status:    Tobacco Use    Smoking status: Some Days     Average packs/day: 0.5 packs/day for 39.5 years (20.2 ttl pk-yrs)     Types: Cigarettes     Start date: 10/1/1976     Last attempt to quit:      Years since quittin.5    Smokeless  tobacco: Never    Tobacco comments:     Keep trying to quit. Pt smokes 6 cigs daily. 6/23/2025   Vaping Use    Vaping status: Never Used   Substance and Sexual Activity    Alcohol use: Yes     Alcohol/week: 1.0 standard drink of alcohol     Types: 1 Glasses of wine per week     Comment: Rare    Drug use: Never    Sexual activity: Not Currently         No results displayed because visit has over 200 results.      Office Visit on 06/16/2025   Component Date Value Ref Range Status    Glucose 06/16/2025 87  65 - 99 mg/dL Final    BUN 06/16/2025 11.0  8.0 - 23.0 mg/dL Final    Creatinine 06/16/2025 0.91  0.57 - 1.00 mg/dL Final    Sodium 06/16/2025 132 (L)  136 - 145 mmol/L Final    Potassium 06/16/2025 4.4  3.5 - 5.2 mmol/L Final    Chloride 06/16/2025 95 (L)  98 - 107 mmol/L Final    CO2 06/16/2025 21.0 (L)  22.0 - 29.0 mmol/L Final    Calcium 06/16/2025 9.2  8.6 - 10.5 mg/dL Final    Total Protein 06/16/2025 7.2  6.0 - 8.5 g/dL Final    Albumin 06/16/2025 3.5  3.5 - 5.2 g/dL Final    ALT (SGPT) 06/16/2025 75 (H)  1 - 33 U/L Final    AST (SGOT) 06/16/2025 45 (H)  1 - 32 U/L Final    Alkaline Phosphatase 06/16/2025 181 (H)  39 - 117 U/L Final    Total Bilirubin 06/16/2025 0.5  0.0 - 1.2 mg/dL Final    Globulin 06/16/2025 3.7  gm/dL Final    A/G Ratio 06/16/2025 0.9  g/dL Final    BUN/Creatinine Ratio 06/16/2025 12.1  7.0 - 25.0 Final    Anion Gap 06/16/2025 16.0 (H)  5.0 - 15.0 mmol/L Final    eGFR 06/16/2025 67.2  >60.0 mL/min/1.73 Final    WBC 06/16/2025 22.70 (H)  3.40 - 10.80 10*3/mm3 Final    RBC 06/16/2025 3.66 (L)  3.77 - 5.28 10*6/mm3 Final    Hemoglobin 06/16/2025 10.7 (L)  12.0 - 15.9 g/dL Final    Hematocrit 06/16/2025 34.5  34.0 - 46.6 % Final    MCV 06/16/2025 94.3  79.0 - 97.0 fL Final    MCH 06/16/2025 29.2  26.6 - 33.0 pg Final    MCHC 06/16/2025 31.0 (L)  31.5 - 35.7 g/dL Final    RDW 06/16/2025 12.9  12.3 - 15.4 % Final    RDW-SD 06/16/2025 44.4  37.0 - 54.0 fl Final    MPV 06/16/2025 9.5  6.0 - 12.0  fL Final    Platelets 06/16/2025 666 (H)  140 - 450 10*3/mm3 Final    Neutrophil % 06/16/2025 78.2 (H)  42.7 - 76.0 % Final    Lymphocyte % 06/16/2025 12.2 (L)  19.6 - 45.3 % Final    Monocyte % 06/16/2025 8.3  5.0 - 12.0 % Final    Eosinophil % 06/16/2025 0.3  0.3 - 6.2 % Final    Basophil % 06/16/2025 0.2  0.0 - 1.5 % Final    Immature Grans % 06/16/2025 0.8 (H)  0.0 - 0.5 % Final    Neutrophils, Absolute 06/16/2025 17.75 (H)  1.70 - 7.00 10*3/mm3 Final    Lymphocytes, Absolute 06/16/2025 2.77  0.70 - 3.10 10*3/mm3 Final    Monocytes, Absolute 06/16/2025 1.88 (H)  0.10 - 0.90 10*3/mm3 Final    Eosinophils, Absolute 06/16/2025 0.07  0.00 - 0.40 10*3/mm3 Final    Basophils, Absolute 06/16/2025 0.05  0.00 - 0.20 10*3/mm3 Final    Immature Grans, Absolute 06/16/2025 0.18 (H)  0.00 - 0.05 10*3/mm3 Final    nRBC 06/16/2025 0.0  0.0 - 0.2 /100 WBC Final    GGT 06/16/2025 123 (H)  5 - 36 U/L Final         Physical Exam  Constitutional:       General: She is not in acute distress.     Appearance: She is not ill-appearing.      Comments: Pale    HENT:      Head: Normocephalic and atraumatic.      Right Ear: Tympanic membrane and ear canal normal.      Left Ear: Tympanic membrane and ear canal normal.      Nose: Nose normal.      Mouth/Throat:      Mouth: Mucous membranes are moist.      Pharynx: No oropharyngeal exudate or posterior oropharyngeal erythema.   Eyes:      General:         Right eye: No discharge.         Left eye: No discharge.      Extraocular Movements: Extraocular movements intact.      Conjunctiva/sclera: Conjunctivae normal.      Pupils: Pupils are equal, round, and reactive to light.   Cardiovascular:      Rate and Rhythm: Normal rate and regular rhythm.      Pulses: Normal pulses.      Heart sounds: Normal heart sounds. No murmur heard.     No friction rub. No gallop.   Pulmonary:      Effort: Pulmonary effort is normal. No respiratory distress.      Breath sounds: Normal breath sounds. No wheezing,  rhonchi or rales.   Abdominal:      Palpations: Abdomen is soft.      Tenderness: There is no abdominal tenderness.   Musculoskeletal:         General: Normal range of motion.      Cervical back: Normal range of motion and neck supple.   Lymphadenopathy:      Cervical: No cervical adenopathy.   Skin:     General: Skin is warm and dry.      Capillary Refill: Capillary refill takes less than 2 seconds.   Neurological:      General: No focal deficit present.      Mental Status: She is alert.   Psychiatric:         Mood and Affect: Mood normal.         Behavior: Behavior normal.         Thought Content: Thought content normal.         Judgment: Judgment normal.          Result Review  Data Reviewed:{ Labs  Result Review  Imaging  Med Tab  Media :23}   I have reviewed this patient's chart.  I have reviewed previous labs, previous imaging, previous medications, and previous encounters with notes that were available in this patient's chart.               Assessment and Plan {CC Problem List  Visit Diagnosis  ROS  Review (Popup)  [facility] [place]  Quality  BestPractice  Medications  SmartSets  SnapShot Encounters  Media :23}      Hospital discharge follow-up         Sepsis due to Escherichia coli, unspecified whether acute organ dysfunction present         Urinary tract infection without hematuria, site unspecified         Elevated liver enzymes    Orders:    Comprehensive metabolic panel; Future    S/P thoracic aortic aneurysm repair         Postoperative anemia    Orders:    CBC w AUTO Differential; Future    Type 2 diabetes mellitus without complication, without long-term current use of insulin      Orders:    metFORMIN (GLUCOPHAGE) 500 MG tablet; Take 1 tablet by mouth 2 (Two) Times a Day With Meals.         Assessment & Plan  Patient was recently hospitalized for E. coli sepsis and UTI without classic symptoms, requiring IV ertapenem via PICC line, managed at home by family. Their condition is further  complicated by recent thoracic aneurysm repair, type 2 diabetes, and COPD, all of which require careful monitoring for decompensation. Post-discharge labs show elevated liver enzymes, likely multifactorial (post-op changes, sepsis, possible medication-related hepatotoxicity), and will be re-evaluated in follow-up. Anemia is being managed with oral iron and expected to improve postoperatively.    The patient remains at high risk of clinical deterioration due to recent sepsis, home IV therapy, complex comorbidities, and polypharmacy. Extensive chart review was completed including discharge summary, medication reconciliation, and all available labs. Home health is in place, and patient has scheduled follow-up with surgery on 07/01/2025. Red flags were reviewed; patient was instructed on when to seek urgent care (worsening weakness, fever, signs of PICC infection). Will continue to monitor closely in outpatient follow-up.      -  -ER red flags discussed with patient including risk versus benefit and education provided.  -Follow-up with me      Follow Up {Instructions Charge Capture  Follow-up Communications :23}     Patient was given instructions and counseling regarding her condition or for health maintenance advice. Please see specific information pulled into the AVS (placed there by myself) if appropriate.    Return in about 1 week (around 6/30/2025) for 1 weeks labs + 1 month recheck.      Gogo Eaton PA-C      Patient or patient representative verbalized consent for the use of Ambient Listening during the visit with  Gogo Eaton PA-C for chart documentation. 6/24/2025  13:03 EDT

## 2025-06-23 NOTE — OUTREACH NOTE
Call Center TCM Note      Flowsheet Row Responses   [facility] patient discharged from? [facility]   Does the patient have one of the following disease processes/diagnoses(primary or secondary)? Sepsis   TCM attempt successful? Yes   Revoked Reason Other  [Per records, pt was seen today (6/23/25) by her PCP for the hosp dc fu apt,  the apt satisfies the TCM guidelines]   Discharge diagnosis Postoperative anemia, E. coli sepsis            Amie GIORDANO - Registered Nurse    6/23/2025, 13:45 EDT

## 2025-06-23 NOTE — CASE MANAGEMENT/SOCIAL WORK
Case Management Discharge Note      Final Note: Home with [facility]    Provided Post Acute Provider List?: Refused  Refused Provider List Comment: Pt voiced she does not want HH and doesn't want to go anywhere for rehab.  Provided Post Acute Provider Quality & Resource List?: Refused  Refused Quality and Resource List Comment: Pt voiced she does not want HH and doesn't want to go anywhere for rehab.    Selected Continued Care - Discharged on 6/20/2025 Admission date: 6/17/2025 - Discharge disposition: Home or Self Care      Dialysis/Infusion Coordination complete.      Service Provider Services Address Phone Fax Patient Preferred    [facility] [address] [address] [phone] --              Home Medical Care Coordination complete.      Service Provider Services Address Phone Fax Patient Preferred    [facility] [address] [place] [address] [phone] --               Transportation Services  Private: Car    Final Discharge Disposition Code: 06 - home with home health care

## 2025-07-09 ENCOUNTER — OFFICE VISIT (OUTPATIENT)
Dept: FAMILY MEDICINE CLINIC | Facility: CLINIC | Age: 75
End: 2025-07-09
Payer: MEDICARE

## 2025-07-09 VITALS
HEART RATE: 71 BPM | DIASTOLIC BLOOD PRESSURE: 62 MMHG | OXYGEN SATURATION: 95 % | RESPIRATION RATE: 16 BRPM | BODY MASS INDEX: 39.37 KG/M2 | HEIGHT: 64 IN | WEIGHT: 230.6 LBS | SYSTOLIC BLOOD PRESSURE: 109 MMHG | TEMPERATURE: 98 F

## 2025-07-09 DIAGNOSIS — A41.51 SEPSIS DUE TO ESCHERICHIA COLI, UNSPECIFIED WHETHER ACUTE ORGAN DYSFUNCTION PRESENT: ICD-10-CM

## 2025-07-09 DIAGNOSIS — E11.9 TYPE 2 DIABETES MELLITUS WITHOUT COMPLICATION, WITHOUT LONG-TERM CURRENT USE OF INSULIN: ICD-10-CM

## 2025-07-09 DIAGNOSIS — R74.8 ELEVATED LIVER ENZYMES: ICD-10-CM

## 2025-07-09 DIAGNOSIS — K76.0 HEPATIC STEATOSIS: ICD-10-CM

## 2025-07-09 DIAGNOSIS — Z78.0 POSTMENOPAUSE: ICD-10-CM

## 2025-07-09 DIAGNOSIS — D64.9 POSTOPERATIVE ANEMIA: Primary | ICD-10-CM

## 2025-07-09 DIAGNOSIS — M85.80 OSTEOPENIA, UNSPECIFIED LOCATION: ICD-10-CM

## 2025-07-09 LAB
BASOPHILS # BLD AUTO: 0.05 10*3/MM3 (ref 0–0.2)
BASOPHILS NFR BLD AUTO: 0.5 % (ref 0–1.5)
DEPRECATED RDW RBC AUTO: 49 FL (ref 37–54)
EOSINOPHIL # BLD AUTO: 0.31 10*3/MM3 (ref 0–0.4)
EOSINOPHIL NFR BLD AUTO: 3.3 % (ref 0.3–6.2)
ERYTHROCYTE [DISTWIDTH] IN BLOOD BY AUTOMATED COUNT: 14.3 % (ref 12.3–15.4)
HCT VFR BLD AUTO: 40.3 % (ref 34–46.6)
HGB BLD-MCNC: 12.3 G/DL (ref 12–15.9)
IMM GRANULOCYTES # BLD AUTO: 0.02 10*3/MM3 (ref 0–0.05)
IMM GRANULOCYTES NFR BLD AUTO: 0.2 % (ref 0–0.5)
LYMPHOCYTES # BLD AUTO: 3.49 10*3/MM3 (ref 0.7–3.1)
LYMPHOCYTES NFR BLD AUTO: 37.5 % (ref 19.6–45.3)
MCH RBC QN AUTO: 29 PG (ref 26.6–33)
MCHC RBC AUTO-ENTMCNC: 30.5 G/DL (ref 31.5–35.7)
MCV RBC AUTO: 95 FL (ref 79–97)
MONOCYTES # BLD AUTO: 0.75 10*3/MM3 (ref 0.1–0.9)
MONOCYTES NFR BLD AUTO: 8.1 % (ref 5–12)
NEUTROPHILS NFR BLD AUTO: 4.69 10*3/MM3 (ref 1.7–7)
NEUTROPHILS NFR BLD AUTO: 50.4 % (ref 42.7–76)
NRBC BLD AUTO-RTO: 0 /100 WBC (ref 0–0.2)
PLATELET # BLD AUTO: 392 10*3/MM3 (ref 140–450)
PMV BLD AUTO: 9.9 FL (ref 6–12)
RBC # BLD AUTO: 4.24 10*6/MM3 (ref 3.77–5.28)
WBC NRBC COR # BLD AUTO: 9.31 10*3/MM3 (ref 3.4–10.8)

## 2025-07-09 PROCEDURE — 85025 COMPLETE CBC W/AUTO DIFF WBC: CPT

## 2025-07-09 PROCEDURE — 1159F MED LIST DOCD IN RCRD: CPT

## 2025-07-09 PROCEDURE — 83036 HEMOGLOBIN GLYCOSYLATED A1C: CPT

## 2025-07-09 PROCEDURE — 84466 ASSAY OF TRANSFERRIN: CPT

## 2025-07-09 PROCEDURE — 1126F AMNT PAIN NOTED NONE PRSNT: CPT

## 2025-07-09 PROCEDURE — 83540 ASSAY OF IRON: CPT

## 2025-07-09 PROCEDURE — 99214 OFFICE O/P EST MOD 30 MIN: CPT

## 2025-07-09 PROCEDURE — 3051F HG A1C>EQUAL 7.0%<8.0%: CPT

## 2025-07-09 PROCEDURE — 1160F RVW MEDS BY RX/DR IN RCRD: CPT

## 2025-07-09 PROCEDURE — 80053 COMPREHEN METABOLIC PANEL: CPT

## 2025-07-09 NOTE — PROGRESS NOTES
Venipuncture Blood Specimen Collection  Venipuncture performed in LT ARM by Cesia Vasquez with good hemostasis. Patient tolerated the procedure well without complications.   07/09/25   Cesia Vasquez

## 2025-07-09 NOTE — PROGRESS NOTES
"Chief Complaint  Nausea (2 week f/u pt is doing much better. )    Subjective    History of Present Illness {CC  Problem List  Visit  Diagnosis   Encounters  Notes  Medications  Labs  Result Review Imaging  Media :23}     Rebekah Li presents to [facility] for Nausea (2 week f/u pt is doing much better. ).      History of Present Illness     History of Present Illness  The patient is a 74-year-old individual who presents for a follow-up visit.     They were last seen a little more than 2 weeks ago and had a recent hospitalization, with a hospital follow-up conducted on 06/23/2025. During their hospitalization, they were discharged on IV antibiotics, which they completed on 07/01/2025. At that time, they had elevated liver enzymes, which were not elevated three months prior. A CT abdomen and pelvis performed on 06/18/2025 noted hepatic steatosis, \"Gallbladder is nondistended.  Biliary tree is nondistended.\"     They report feeling significantly better since completing their antibiotics.     They have transitioned from using a walker to a cane for mobility and are able to navigate their home without support. They use the cane when going out due to instability but hope to discontinue its use in the next 1 to 2 weeks. They are now permitted to drive and can walk around places like [employer] to increase their physical activity. They report no nausea or weakness but do feel fatigued, though less so than before. Their appetite has improved. They are currently taking an iron supplement every other day.    They attempted to schedule a DEXA scan a few months ago but were informed that there was no order for it. They would like to have the scan done in [place]. They plan to transfer all their medical records to [place] after their upcoming cardiology appointment.     They report no urinary symptoms such as burning or discoloration, except for bright yellow urine due to their new " "multivitamin intake.    They are currently on Plavix, which they were advised to take for 90 days post-surgery to prevent blood clots. They are approximately MCFP through this course.        Objective     Vital Signs:   /62   Pulse 71   Temp 98 °F (36.7 °C) (Temporal)   Resp 16   Ht 163.2 cm (64.25\")   Wt 105 kg (230 lb 9.6 oz)   SpO2 95%   BMI 39.27 kg/m²   Current Outpatient Medications on File Prior to Visit   Medication Sig Dispense Refill    acetaminophen (TYLENOL) 325 MG tablet Take 2 tablets by mouth Every 6 (Six) Hours As Needed for Mild Pain, Headache or Fever.      albuterol (ACCUNEB) 1.25 MG/3ML nebulizer solution Take 3 mL by nebulization Every 4 (Four) Hours As Needed for Wheezing.      albuterol sulfate  (90 Base) MCG/ACT inhaler Inhale 2 puffs Every 4 (Four) Hours As Needed for Shortness of Air.      amLODIPine (NORVASC) 2.5 MG tablet Take 1 tablet by mouth Daily.      ammonium lactate (AMLACTIN) 12 % cream Apply 12 doses topically to the appropriate area as directed.      aspirin 81 MG EC tablet Take 1 tablet by mouth Daily.      buPROPion XL (WELLBUTRIN XL) 150 MG 24 hr tablet Take 1 tablet by mouth Daily. 30 tablet 2    clopidogrel (PLAVIX) 75 MG tablet Take 1 tablet by mouth Daily.      famotidine (PEPCID) 40 MG tablet Take 1 tablet by mouth Daily.      ferrous sulfate 325 (65 FE) MG tablet Take 1 tablet by mouth Every Other Day. 30 tablet 1    loratadine (CLARITIN) 10 MG tablet Take 1 tablet by mouth Daily. 90 tablet 0    metFORMIN (GLUCOPHAGE) 500 MG tablet Take 1 tablet by mouth 2 (Two) Times a Day With Meals. 180 tablet 0    metoprolol tartrate (LOPRESSOR) 25 MG tablet Take 1 tablet by mouth 2 (Two) Times a Day.      multivitamin (Thera) tablet tablet Take 1 tablet by mouth Daily.      ondansetron ODT (ZOFRAN-ODT) 4 MG disintegrating tablet Place 1 tablet on the tongue Every 12 (Twelve) Hours As Needed for Nausea or Vomiting. 20 tablet 0    pantoprazole (PROTONIX) 40 MG " EC tablet Take 1 tablet by mouth Daily.      rosuvastatin (CRESTOR) 20 MG tablet Take 1 tablet by mouth Daily.      sertraline (ZOLOFT) 50 MG tablet Take 1 tablet by mouth Daily.      traMADol (ULTRAM) 50 MG tablet Take 1 tablet by mouth Every 6 (Six) Hours As Needed for Moderate Pain.      varenicline (CHANTIX) 1 MG tablet Take 1 tablet by mouth 2 (Two) Times a Day. 56 tablet 0     No current facility-administered medications on file prior to visit.        Past Medical History:   Diagnosis Date    Anxiety     Cataract     COPD (chronic obstructive pulmonary disease)     Depression     Diabetes mellitus     GERD (gastroesophageal reflux disease)     Hyperlipidemia     Hypertension     Obesity     Peptic ulceration       Past Surgical History:   Procedure Laterality Date    APPENDECTOMY      CARDIAC CATHETERIZATION      CARDIAC SURGERY      Aneurysm repair    COLONOSCOPY      HYSTERECTOMY      TUBAL ABDOMINAL LIGATION        Family History   Problem Relation Age of Onset    Hypertension Mother             Arthritis Mother     Hypertension Father     Stroke Father     Heart disease Father             Hypertension Brother     Heart disease Brother     Diabetes Brother             Anxiety disorder Brother     Depression Daughter     Diabetes Daughter     Anxiety disorder Daughter     Hypertension Daughter     Depression Son             Diabetes Brother             Hypertension Brother       Social History     Socioeconomic History    Marital status:    Tobacco Use    Smoking status: Some Days     Average packs/day: 0.5 packs/day for 39.5 years (20.2 ttl pk-yrs)     Types: Cigarettes     Start date: 10/1/1976     Last attempt to quit: 1988     Years since quittin.5    Smokeless tobacco: Never    Tobacco comments:     Keep trying to quit. Pt smokes 6 cigs daily. 2025   Vaping Use    Vaping status: Never Used   Substance and Sexual Activity    Alcohol use: Yes      Alcohol/week: 1.0 standard drink of alcohol     Types: 1 Glasses of wine per week     Comment: Rare    Drug use: Never    Sexual activity: Not Currently         No results displayed because visit has over 200 results.      Office Visit on 06/16/2025   Component Date Value Ref Range Status    Glucose 06/16/2025 87  65 - 99 mg/dL Final    BUN 06/16/2025 11.0  8.0 - 23.0 mg/dL Final    Creatinine 06/16/2025 0.91  0.57 - 1.00 mg/dL Final    Sodium 06/16/2025 132 (L)  136 - 145 mmol/L Final    Potassium 06/16/2025 4.4  3.5 - 5.2 mmol/L Final    Chloride 06/16/2025 95 (L)  98 - 107 mmol/L Final    CO2 06/16/2025 21.0 (L)  22.0 - 29.0 mmol/L Final    Calcium 06/16/2025 9.2  8.6 - 10.5 mg/dL Final    Total Protein 06/16/2025 7.2  6.0 - 8.5 g/dL Final    Albumin 06/16/2025 3.5  3.5 - 5.2 g/dL Final    ALT (SGPT) 06/16/2025 75 (H)  1 - 33 U/L Final    AST (SGOT) 06/16/2025 45 (H)  1 - 32 U/L Final    Alkaline Phosphatase 06/16/2025 181 (H)  39 - 117 U/L Final    Total Bilirubin 06/16/2025 0.5  0.0 - 1.2 mg/dL Final    Globulin 06/16/2025 3.7  gm/dL Final    A/G Ratio 06/16/2025 0.9  g/dL Final    BUN/Creatinine Ratio 06/16/2025 12.1  7.0 - 25.0 Final    Anion Gap 06/16/2025 16.0 (H)  5.0 - 15.0 mmol/L Final    eGFR 06/16/2025 67.2  >60.0 mL/min/1.73 Final    WBC 06/16/2025 22.70 (H)  3.40 - 10.80 10*3/mm3 Final    RBC 06/16/2025 3.66 (L)  3.77 - 5.28 10*6/mm3 Final    Hemoglobin 06/16/2025 10.7 (L)  12.0 - 15.9 g/dL Final    Hematocrit 06/16/2025 34.5  34.0 - 46.6 % Final    MCV 06/16/2025 94.3  79.0 - 97.0 fL Final    MCH 06/16/2025 29.2  26.6 - 33.0 pg Final    MCHC 06/16/2025 31.0 (L)  31.5 - 35.7 g/dL Final    RDW 06/16/2025 12.9  12.3 - 15.4 % Final    RDW-SD 06/16/2025 44.4  37.0 - 54.0 fl Final    MPV 06/16/2025 9.5  6.0 - 12.0 fL Final    Platelets 06/16/2025 666 (H)  140 - 450 10*3/mm3 Final    Neutrophil % 06/16/2025 78.2 (H)  42.7 - 76.0 % Final    Lymphocyte % 06/16/2025 12.2 (L)  19.6 - 45.3 % Final    Monocyte %  06/16/2025 8.3  5.0 - 12.0 % Final    Eosinophil % 06/16/2025 0.3  0.3 - 6.2 % Final    Basophil % 06/16/2025 0.2  0.0 - 1.5 % Final    Immature Grans % 06/16/2025 0.8 (H)  0.0 - 0.5 % Final    Neutrophils, Absolute 06/16/2025 17.75 (H)  1.70 - 7.00 10*3/mm3 Final    Lymphocytes, Absolute 06/16/2025 2.77  0.70 - 3.10 10*3/mm3 Final    Monocytes, Absolute 06/16/2025 1.88 (H)  0.10 - 0.90 10*3/mm3 Final    Eosinophils, Absolute 06/16/2025 0.07  0.00 - 0.40 10*3/mm3 Final    Basophils, Absolute 06/16/2025 0.05  0.00 - 0.20 10*3/mm3 Final    Immature Grans, Absolute 06/16/2025 0.18 (H)  0.00 - 0.05 10*3/mm3 Final    nRBC 06/16/2025 0.0  0.0 - 0.2 /100 WBC Final    GGT 06/16/2025 123 (H)  5 - 36 U/L Final         Physical Exam  Vitals and nursing note reviewed.   Constitutional:       Comments: Pale.   HENT:      Head: Normocephalic and atraumatic.   Eyes:      General: No scleral icterus.     Extraocular Movements: Extraocular movements intact.   Cardiovascular:      Rate and Rhythm: Normal rate and regular rhythm.      Pulses: Normal pulses.      Heart sounds: Normal heart sounds. No murmur heard.     No friction rub. No gallop.   Pulmonary:      Effort: Pulmonary effort is normal.      Breath sounds: Normal breath sounds. No wheezing, rhonchi or rales.   Abdominal:      General: Abdomen is flat. Bowel sounds are normal.      Palpations: Abdomen is soft.      Tenderness: There is no abdominal tenderness. There is no right CVA tenderness or left CVA tenderness.   Musculoskeletal:      Cervical back: Neck supple.   Skin:     General: Skin is warm and dry.   Neurological:      Mental Status: She is alert. Mental status is at baseline.   Psychiatric:         Mood and Affect: Mood normal.         Behavior: Behavior normal.         Thought Content: Thought content normal.         Judgment: Judgment normal.          Result Review  Data Reviewed:{ Labs  Result Review  Imaging  Med Tab  Media :23}   I have reviewed this  patient's chart.  I have reviewed previous labs, previous imaging, previous medications, and previous encounters with notes that were available in this patient's chart.               Assessment and Plan {CC Problem List  Visit Diagnosis  ROS  Review (Popup)  [facility]  BestPractice  Medications  SmartSets  SnapShot Encounters  Media :23}      Postoperative anemia    Orders:    Iron and TIBC; Future    CBC w AUTO Differential    H/o sepsis due to Escherichia coli, unspecified whether acute organ dysfunction present         Type 2 diabetes mellitus without complication, without long-term current use of insulin      Orders:    Hemoglobin A1c; Future    Elevated liver enzymes    Orders:    Comprehensive metabolic panel    Hepatic steatosis    Orders:    Comprehensive metabolic panel    Osteopenia, unspecified location    Orders:    DEXA Bone Density Axial; Future    Postmenopause    Orders:    DEXA Bone Density Axial; Future         Assessment & Plan  1. Anemia:  - Post-operative  - Currently taking an iron supplement every other day for minimizing gastrointestinal side effects.  - Order complete blood count (CBC) today to monitor    2. Osteopenia:  - Last DEXA scan conducted in 2021, revealing osteopenia.  - New DEXA scan ordered; patient will be contacted to schedule the appointment.  - Discussed importance of monitoring bone density and ensuring appropriate follow-up.  - Coordinate with patient to ensure scan is completed at a convenient location.    3. Recent hospitalization:  - Recent hospitalization, discharged on IV antibiotics completed on 07/01/2025.  - Reports feeling significantly better with no nausea or weakness, though still experiencing some fatigue.  - Repeat CBC and CMP will be performed today to monitor   - Review lab results and adjust treatment plan if necessary.    4. DM  -Update A1c and see need for medication adjustment    5. Elevated liver enzymes + hepatic  steatosis  -hepatic steatosis seen on recent CT ABD/PEL  -liver enzymes elevated at least since 06/18 - 3 months prior they were normal   -She is already no alcohol/Tylenol  -repeat CMP today - could be d/t recent sepsis     Follow-up:  - Follow-up visit scheduled in 3 months.        -ER red flags discussed with patient including risk versus benefit and education provided.  -Follow-up with me      Follow Up {Instructions Charge Capture  Follow-up Communications :23}     Patient was given instructions and counseling regarding her condition or for health maintenance advice. Please see specific information pulled into the AVS (placed there by myself) if appropriate.    Return in about 3 months (around 10/9/2025) for Follow up on chronic conditions/ongoing concerns.      Gogo Eaton PA-C      Patient or patient representative verbalized consent for the use of Ambient Listening during the visit with  Gogo Eaton PA-C for chart documentation. 7/9/2025  08:25 EDT

## 2025-07-10 LAB
ALBUMIN SERPL-MCNC: 3.5 G/DL (ref 3.5–5.2)
ALBUMIN/GLOB SERPL: 1.1 G/DL
ALP SERPL-CCNC: 99 U/L (ref 39–117)
ALT SERPL W P-5'-P-CCNC: 16 U/L (ref 1–33)
ANION GAP SERPL CALCULATED.3IONS-SCNC: 11.8 MMOL/L (ref 5–15)
AST SERPL-CCNC: 17 U/L (ref 1–32)
BILIRUB SERPL-MCNC: 0.3 MG/DL (ref 0–1.2)
BUN SERPL-MCNC: 17 MG/DL (ref 8–23)
BUN/CREAT SERPL: 18.1 (ref 7–25)
CALCIUM SPEC-SCNC: 9.4 MG/DL (ref 8.6–10.5)
CHLORIDE SERPL-SCNC: 107 MMOL/L (ref 98–107)
CO2 SERPL-SCNC: 25.2 MMOL/L (ref 22–29)
CREAT SERPL-MCNC: 0.94 MG/DL (ref 0.57–1)
EGFRCR SERPLBLD CKD-EPI 2021: 64.6 ML/MIN/1.73
GLOBULIN UR ELPH-MCNC: 3.3 GM/DL
GLUCOSE SERPL-MCNC: 110 MG/DL (ref 65–99)
HBA1C MFR BLD: 6.4 % (ref 4.8–5.6)
IRON 24H UR-MRATE: 72 MCG/DL (ref 37–145)
IRON SATN MFR SERPL: 17 % (ref 20–50)
POTASSIUM SERPL-SCNC: 3.9 MMOL/L (ref 3.5–5.2)
PROT SERPL-MCNC: 6.8 G/DL (ref 6–8.5)
SODIUM SERPL-SCNC: 144 MMOL/L (ref 136–145)
TIBC SERPL-MCNC: 429 MCG/DL (ref 298–536)
TRANSFERRIN SERPL-MCNC: 288 MG/DL (ref 200–360)

## 2025-07-17 ENCOUNTER — HOSPITAL ENCOUNTER (OUTPATIENT)
Dept: BONE DENSITY | Facility: HOSPITAL | Age: 75
Discharge: HOME | End: 2025-07-17
Payer: MEDICARE

## 2025-07-17 DIAGNOSIS — Z78.0 POSTMENOPAUSE: ICD-10-CM

## 2025-07-17 DIAGNOSIS — M85.80 OSTEOPENIA, UNSPECIFIED LOCATION: ICD-10-CM

## 2025-07-17 PROCEDURE — 77080 DXA BONE DENSITY AXIAL: CPT

## 2025-08-03 DIAGNOSIS — F17.200 TOBACCO USE DISORDER: ICD-10-CM

## 2025-08-04 RX ORDER — VARENICLINE TARTRATE 1 MG/1
1 TABLET, FILM COATED ORAL 2 TIMES DAILY
Qty: 56 TABLET | Refills: 0 | Status: SHIPPED | OUTPATIENT
Start: 2025-08-04

## 2025-08-11 RX ORDER — ROSUVASTATIN CALCIUM 20 MG/1
20 TABLET, COATED ORAL DAILY
Qty: 90 TABLET | Refills: 0 | Status: SHIPPED | OUTPATIENT
Start: 2025-08-11

## 2025-08-18 RX ORDER — BUPROPION HYDROCHLORIDE 150 MG/1
150 TABLET ORAL DAILY
Qty: 30 TABLET | Refills: 2 | Status: SHIPPED | OUTPATIENT
Start: 2025-08-18

## 2025-08-19 ENCOUNTER — READMISSION MANAGEMENT (OUTPATIENT)
Dept: CALL CENTER | Facility: HOSPITAL | Age: 75
End: 2025-08-19
Payer: MEDICARE

## 2025-08-21 ENCOUNTER — EXTERNAL PBMM DATA (OUTPATIENT)
Dept: PHARMACY | Facility: OTHER | Age: 75
End: 2025-08-21
Payer: MEDICARE

## 2025-08-21 DIAGNOSIS — F17.200 TOBACCO USE DISORDER: ICD-10-CM

## 2025-08-21 RX ORDER — VARENICLINE TARTRATE 1 MG/1
1 TABLET, FILM COATED ORAL 2 TIMES DAILY
Qty: 56 TABLET | Refills: 0 | Status: SHIPPED | OUTPATIENT
Start: 2025-08-21